# Patient Record
Sex: MALE | Race: ASIAN | Employment: UNEMPLOYED | URBAN - METROPOLITAN AREA
[De-identification: names, ages, dates, MRNs, and addresses within clinical notes are randomized per-mention and may not be internally consistent; named-entity substitution may affect disease eponyms.]

---

## 2017-05-27 ENCOUNTER — APPOINTMENT (EMERGENCY)
Dept: RADIOLOGY | Facility: HOSPITAL | Age: 62
DRG: 281 | End: 2017-05-27
Payer: COMMERCIAL

## 2017-05-27 ENCOUNTER — HOSPITAL ENCOUNTER (INPATIENT)
Facility: HOSPITAL | Age: 62
LOS: 5 days | Discharge: HOME | DRG: 281 | End: 2017-06-02
Attending: EMERGENCY MEDICINE | Admitting: INTERNAL MEDICINE
Payer: COMMERCIAL

## 2017-05-27 DIAGNOSIS — R79.89 ELEVATED SERUM CREATININE: ICD-10-CM

## 2017-05-27 DIAGNOSIS — R55 SYNCOPE: Primary | ICD-10-CM

## 2017-05-27 DIAGNOSIS — R77.8 ELEVATED TROPONIN: ICD-10-CM

## 2017-05-27 DIAGNOSIS — N18.4 STAGE 4 CHRONIC KIDNEY DISEASE (HCC): ICD-10-CM

## 2017-05-27 LAB
ANION GAP SERPL CALCULATED.3IONS-SCNC: 9 MMOL/L (ref 4–13)
APTT PPP: 29 SECONDS (ref 23–35)
BASOPHILS # BLD AUTO: 0.03 THOUSANDS/ΜL (ref 0–0.1)
BASOPHILS NFR BLD AUTO: 0 % (ref 0–1)
BUN SERPL-MCNC: 30 MG/DL (ref 5–25)
CALCIUM SERPL-MCNC: 9.2 MG/DL (ref 8.3–10.1)
CHLORIDE SERPL-SCNC: 102 MMOL/L (ref 100–108)
CO2 SERPL-SCNC: 28 MMOL/L (ref 21–32)
CREAT SERPL-MCNC: 3.01 MG/DL (ref 0.6–1.3)
EOSINOPHIL # BLD AUTO: 0.05 THOUSAND/ΜL (ref 0–0.61)
EOSINOPHIL NFR BLD AUTO: 0 % (ref 0–6)
ERYTHROCYTE [DISTWIDTH] IN BLOOD BY AUTOMATED COUNT: 14.5 % (ref 11.6–15.1)
GFR SERPL CREATININE-BSD FRML MDRD: 21.2 ML/MIN/1.73SQ M
GLUCOSE SERPL-MCNC: 205 MG/DL (ref 65–140)
HCT VFR BLD AUTO: 34.3 % (ref 36.5–49.3)
HGB BLD-MCNC: 11.2 G/DL (ref 12–17)
INR PPP: 1 (ref 0.86–1.16)
LYMPHOCYTES # BLD AUTO: 1.99 THOUSANDS/ΜL (ref 0.6–4.47)
LYMPHOCYTES NFR BLD AUTO: 18 % (ref 14–44)
MCH RBC QN AUTO: 25 PG (ref 26.8–34.3)
MCHC RBC AUTO-ENTMCNC: 32.7 G/DL (ref 31.4–37.4)
MCV RBC AUTO: 77 FL (ref 82–98)
MONOCYTES # BLD AUTO: 1.01 THOUSAND/ΜL (ref 0.17–1.22)
MONOCYTES NFR BLD AUTO: 9 % (ref 4–12)
NEUTROPHILS # BLD AUTO: 8.17 THOUSANDS/ΜL (ref 1.85–7.62)
NEUTS SEG NFR BLD AUTO: 73 % (ref 43–75)
NRBC BLD AUTO-RTO: 0 /100 WBCS
PLATELET # BLD AUTO: 368 THOUSANDS/UL (ref 149–390)
PMV BLD AUTO: 9.7 FL (ref 8.9–12.7)
POTASSIUM SERPL-SCNC: 3.9 MMOL/L (ref 3.5–5.3)
PROTHROMBIN TIME: 13.2 SECONDS (ref 12.1–14.4)
RBC # BLD AUTO: 4.48 MILLION/UL (ref 3.88–5.62)
SODIUM SERPL-SCNC: 139 MMOL/L (ref 136–145)
SPECIMEN SOURCE: ABNORMAL
TROPONIN I BLD-MCNC: 17.83 NG/ML (ref 0–0.08)
WBC # BLD AUTO: 11.31 THOUSAND/UL (ref 4.31–10.16)

## 2017-05-27 PROCEDURE — 36415 COLL VENOUS BLD VENIPUNCTURE: CPT

## 2017-05-27 PROCEDURE — 84484 ASSAY OF TROPONIN QUANT: CPT

## 2017-05-27 PROCEDURE — 80076 HEPATIC FUNCTION PANEL: CPT | Performed by: INTERNAL MEDICINE

## 2017-05-27 PROCEDURE — 85730 THROMBOPLASTIN TIME PARTIAL: CPT | Performed by: EMERGENCY MEDICINE

## 2017-05-27 PROCEDURE — 80048 BASIC METABOLIC PNL TOTAL CA: CPT

## 2017-05-27 PROCEDURE — 93005 ELECTROCARDIOGRAM TRACING: CPT

## 2017-05-27 PROCEDURE — 85610 PROTHROMBIN TIME: CPT | Performed by: EMERGENCY MEDICINE

## 2017-05-27 PROCEDURE — 93005 ELECTROCARDIOGRAM TRACING: CPT | Performed by: EMERGENCY MEDICINE

## 2017-05-27 PROCEDURE — 85025 COMPLETE CBC W/AUTO DIFF WBC: CPT

## 2017-05-28 ENCOUNTER — APPOINTMENT (EMERGENCY)
Dept: RADIOLOGY | Facility: HOSPITAL | Age: 62
DRG: 281 | End: 2017-05-28
Payer: COMMERCIAL

## 2017-05-28 ENCOUNTER — APPOINTMENT (INPATIENT)
Dept: RADIOLOGY | Facility: HOSPITAL | Age: 62
DRG: 281 | End: 2017-05-28
Payer: COMMERCIAL

## 2017-05-28 PROBLEM — R77.8 ELEVATED TROPONIN: Status: ACTIVE | Noted: 2017-05-28

## 2017-05-28 PROBLEM — Z78.9 POOR HISTORIAN: Status: ACTIVE | Noted: 2017-05-28

## 2017-05-28 PROBLEM — Z91.14 NON COMPLIANCE W MEDICATION REGIMEN: Status: ACTIVE | Noted: 2017-05-28

## 2017-05-28 PROBLEM — E11.9 DIABETES MELLITUS TYPE 2 IN NONOBESE (HCC): Status: ACTIVE | Noted: 2017-05-28

## 2017-05-28 PROBLEM — N18.4 STAGE 4 CHRONIC KIDNEY DISEASE (HCC): Status: ACTIVE | Noted: 2017-05-28

## 2017-05-28 PROBLEM — I73.9 PERIPHERAL VASCULAR DISEASE (HCC): Status: ACTIVE | Noted: 2017-05-28

## 2017-05-28 PROBLEM — R55 SYNCOPAL EPISODES: Status: ACTIVE | Noted: 2017-05-28

## 2017-05-28 LAB
ALBUMIN SERPL BCP-MCNC: 3 G/DL (ref 3.5–5)
ALBUMIN SERPL BCP-MCNC: 3.8 G/DL (ref 3.5–5)
ALP SERPL-CCNC: 100 U/L (ref 46–116)
ALP SERPL-CCNC: 77 U/L (ref 46–116)
ALT SERPL W P-5'-P-CCNC: 28 U/L (ref 12–78)
ALT SERPL W P-5'-P-CCNC: 34 U/L (ref 12–78)
ANION GAP SERPL CALCULATED.3IONS-SCNC: 9 MMOL/L (ref 4–13)
APTT PPP: 51 SECONDS (ref 23–35)
APTT PPP: 65 SECONDS (ref 23–35)
APTT PPP: 76 SECONDS (ref 23–35)
AST SERPL W P-5'-P-CCNC: 57 U/L (ref 5–45)
AST SERPL W P-5'-P-CCNC: 98 U/L (ref 5–45)
ATRIAL RATE: 106 BPM
BACTERIA UR QL AUTO: ABNORMAL /HPF
BILIRUB DIRECT SERPL-MCNC: 0.19 MG/DL (ref 0–0.2)
BILIRUB SERPL-MCNC: 0.64 MG/DL (ref 0.2–1)
BILIRUB SERPL-MCNC: 0.87 MG/DL (ref 0.2–1)
BILIRUB UR QL STRIP: NEGATIVE
BUN SERPL-MCNC: 31 MG/DL (ref 5–25)
CALCIUM SERPL-MCNC: 8.6 MG/DL (ref 8.3–10.1)
CHLORIDE SERPL-SCNC: 107 MMOL/L (ref 100–108)
CHLORIDE UR-SCNC: 100 MMOL/L (ref 10–330)
CHOLEST SERPL-MCNC: 103 MG/DL (ref 50–200)
CLARITY UR: CLEAR
CO2 SERPL-SCNC: 25 MMOL/L (ref 21–32)
COLOR UR: YELLOW
CREAT SERPL-MCNC: 2.7 MG/DL (ref 0.6–1.3)
CREAT UR-MCNC: 151 MG/DL
ERYTHROCYTE [DISTWIDTH] IN BLOOD BY AUTOMATED COUNT: 14.6 % (ref 11.6–15.1)
EST. AVERAGE GLUCOSE BLD GHB EST-MCNC: 194 MG/DL
GFR SERPL CREATININE-BSD FRML MDRD: 24.1 ML/MIN/1.73SQ M
GLUCOSE SERPL-MCNC: 154 MG/DL (ref 65–140)
GLUCOSE SERPL-MCNC: 168 MG/DL (ref 65–140)
GLUCOSE SERPL-MCNC: 168 MG/DL (ref 65–140)
GLUCOSE SERPL-MCNC: 178 MG/DL (ref 65–140)
GLUCOSE SERPL-MCNC: 195 MG/DL (ref 65–140)
GLUCOSE SERPL-MCNC: 213 MG/DL (ref 65–140)
GLUCOSE UR STRIP-MCNC: ABNORMAL MG/DL
HBA1C MFR BLD: 8.4 % (ref 4.2–6.3)
HCT VFR BLD AUTO: 29.6 % (ref 36.5–49.3)
HDLC SERPL-MCNC: 40 MG/DL (ref 40–60)
HGB BLD-MCNC: 9.8 G/DL (ref 12–17)
HGB UR QL STRIP.AUTO: NEGATIVE
HYALINE CASTS #/AREA URNS LPF: ABNORMAL /LPF
KETONES UR STRIP-MCNC: NEGATIVE MG/DL
LDLC SERPL CALC-MCNC: 43 MG/DL (ref 0–100)
LEUKOCYTE ESTERASE UR QL STRIP: NEGATIVE
MAGNESIUM SERPL-MCNC: 2.2 MG/DL (ref 1.6–2.6)
MCH RBC QN AUTO: 25.1 PG (ref 26.8–34.3)
MCHC RBC AUTO-ENTMCNC: 33.1 G/DL (ref 31.4–37.4)
MCV RBC AUTO: 76 FL (ref 82–98)
NITRITE UR QL STRIP: NEGATIVE
NON-SQ EPI CELLS URNS QL MICRO: ABNORMAL /HPF
NT-PROBNP SERPL-MCNC: ABNORMAL PG/ML
P AXIS: 81 DEGREES
PH UR STRIP.AUTO: 6 [PH] (ref 4.5–8)
PHOSPHATE SERPL-MCNC: 3.4 MG/DL (ref 2.3–4.1)
PLATELET # BLD AUTO: 340 THOUSANDS/UL (ref 149–390)
PMV BLD AUTO: 9.8 FL (ref 8.9–12.7)
POTASSIUM SERPL-SCNC: 3.9 MMOL/L (ref 3.5–5.3)
PR INTERVAL: 166 MS
PROT SERPL-MCNC: 7 G/DL (ref 6.4–8.2)
PROT SERPL-MCNC: 8.6 G/DL (ref 6.4–8.2)
PROT UR STRIP-MCNC: ABNORMAL MG/DL
QRS AXIS: 101 DEGREES
QRSD INTERVAL: 100 MS
QT INTERVAL: 384 MS
QTC INTERVAL: 510 MS
RBC # BLD AUTO: 3.9 MILLION/UL (ref 3.88–5.62)
RBC #/AREA URNS AUTO: ABNORMAL /HPF
SODIUM 24H UR-SCNC: 107 MOL/L
SODIUM SERPL-SCNC: 141 MMOL/L (ref 136–145)
SP GR UR STRIP.AUTO: 1.02 (ref 1–1.03)
T WAVE AXIS: 189 DEGREES
TRIGL SERPL-MCNC: 100 MG/DL
TROPONIN I SERPL-MCNC: >40 NG/ML
UROBILINOGEN UR QL STRIP.AUTO: 0.2 E.U./DL
VENTRICULAR RATE: 106 BPM
WBC # BLD AUTO: 8.8 THOUSAND/UL (ref 4.31–10.16)
WBC #/AREA URNS AUTO: ABNORMAL /HPF

## 2017-05-28 PROCEDURE — 36415 COLL VENOUS BLD VENIPUNCTURE: CPT | Performed by: INTERNAL MEDICINE

## 2017-05-28 PROCEDURE — 83036 HEMOGLOBIN GLYCOSYLATED A1C: CPT | Performed by: INTERNAL MEDICINE

## 2017-05-28 PROCEDURE — 99285 EMERGENCY DEPT VISIT HI MDM: CPT

## 2017-05-28 PROCEDURE — 83880 ASSAY OF NATRIURETIC PEPTIDE: CPT | Performed by: INTERNAL MEDICINE

## 2017-05-28 PROCEDURE — 82436 ASSAY OF URINE CHLORIDE: CPT | Performed by: INTERNAL MEDICINE

## 2017-05-28 PROCEDURE — 84300 ASSAY OF URINE SODIUM: CPT | Performed by: INTERNAL MEDICINE

## 2017-05-28 PROCEDURE — 70450 CT HEAD/BRAIN W/O DYE: CPT

## 2017-05-28 PROCEDURE — 71250 CT THORAX DX C-: CPT

## 2017-05-28 PROCEDURE — 80061 LIPID PANEL: CPT | Performed by: INTERNAL MEDICINE

## 2017-05-28 PROCEDURE — 82570 ASSAY OF URINE CREATININE: CPT | Performed by: INTERNAL MEDICINE

## 2017-05-28 PROCEDURE — 85730 THROMBOPLASTIN TIME PARTIAL: CPT | Performed by: INTERNAL MEDICINE

## 2017-05-28 PROCEDURE — 84100 ASSAY OF PHOSPHORUS: CPT | Performed by: INTERNAL MEDICINE

## 2017-05-28 PROCEDURE — 81001 URINALYSIS AUTO W/SCOPE: CPT | Performed by: INTERNAL MEDICINE

## 2017-05-28 PROCEDURE — 83735 ASSAY OF MAGNESIUM: CPT | Performed by: INTERNAL MEDICINE

## 2017-05-28 PROCEDURE — 80053 COMPREHEN METABOLIC PANEL: CPT | Performed by: INTERNAL MEDICINE

## 2017-05-28 PROCEDURE — 76770 US EXAM ABDO BACK WALL COMP: CPT

## 2017-05-28 PROCEDURE — 85027 COMPLETE CBC AUTOMATED: CPT | Performed by: INTERNAL MEDICINE

## 2017-05-28 PROCEDURE — 82948 REAGENT STRIP/BLOOD GLUCOSE: CPT

## 2017-05-28 PROCEDURE — 85730 THROMBOPLASTIN TIME PARTIAL: CPT | Performed by: FAMILY MEDICINE

## 2017-05-28 PROCEDURE — 84484 ASSAY OF TROPONIN QUANT: CPT | Performed by: INTERNAL MEDICINE

## 2017-05-28 RX ORDER — ATORVASTATIN CALCIUM 40 MG/1
40 TABLET, FILM COATED ORAL DAILY
COMMUNITY

## 2017-05-28 RX ORDER — ASPIRIN 325 MG
325 TABLET ORAL DAILY
Status: DISCONTINUED | OUTPATIENT
Start: 2017-05-28 | End: 2017-05-29

## 2017-05-28 RX ORDER — HEPARIN SODIUM 1000 [USP'U]/ML
5600 INJECTION, SOLUTION INTRAVENOUS; SUBCUTANEOUS ONCE
Status: COMPLETED | OUTPATIENT
Start: 2017-05-28 | End: 2017-05-28

## 2017-05-28 RX ORDER — ATORVASTATIN CALCIUM 40 MG/1
40 TABLET, FILM COATED ORAL EVERY EVENING
Status: DISCONTINUED | OUTPATIENT
Start: 2017-05-29 | End: 2017-06-02 | Stop reason: HOSPADM

## 2017-05-28 RX ORDER — LOSARTAN POTASSIUM 25 MG/1
25 TABLET ORAL DAILY
COMMUNITY
End: 2017-06-02 | Stop reason: HOSPADM

## 2017-05-28 RX ORDER — ATORVASTATIN CALCIUM 80 MG/1
80 TABLET, FILM COATED ORAL ONCE
Status: COMPLETED | OUTPATIENT
Start: 2017-05-28 | End: 2017-05-28

## 2017-05-28 RX ORDER — HEPARIN SODIUM 1000 [USP'U]/ML
2800 INJECTION, SOLUTION INTRAVENOUS; SUBCUTANEOUS AS NEEDED
Status: DISCONTINUED | OUTPATIENT
Start: 2017-05-28 | End: 2017-05-28 | Stop reason: DRUGHIGH

## 2017-05-28 RX ORDER — LABETALOL 200 MG/1
200 TABLET, FILM COATED ORAL 2 TIMES DAILY
COMMUNITY
End: 2017-06-02 | Stop reason: HOSPADM

## 2017-05-28 RX ORDER — CLOPIDOGREL BISULFATE 75 MG/1
300 TABLET ORAL ONCE
Status: COMPLETED | OUTPATIENT
Start: 2017-05-28 | End: 2017-05-28

## 2017-05-28 RX ORDER — HEPARIN SODIUM 10000 [USP'U]/100ML
3-30 INJECTION, SOLUTION INTRAVENOUS
Status: DISCONTINUED | OUTPATIENT
Start: 2017-05-28 | End: 2017-05-28 | Stop reason: DRUGHIGH

## 2017-05-28 RX ORDER — AMLODIPINE BESYLATE 10 MG/1
10 TABLET ORAL DAILY
COMMUNITY
End: 2017-06-02 | Stop reason: HOSPADM

## 2017-05-28 RX ORDER — HEPARIN SODIUM 1000 [USP'U]/ML
5600 INJECTION, SOLUTION INTRAVENOUS; SUBCUTANEOUS AS NEEDED
Status: DISCONTINUED | OUTPATIENT
Start: 2017-05-28 | End: 2017-05-28 | Stop reason: DRUGHIGH

## 2017-05-28 RX ORDER — ATORVASTATIN CALCIUM 40 MG/1
40 TABLET, FILM COATED ORAL EVERY EVENING
Status: DISCONTINUED | OUTPATIENT
Start: 2017-05-28 | End: 2017-05-28

## 2017-05-28 RX ORDER — HEPARIN SODIUM 1000 [USP'U]/ML
2000 INJECTION, SOLUTION INTRAVENOUS; SUBCUTANEOUS AS NEEDED
Status: DISCONTINUED | OUTPATIENT
Start: 2017-05-28 | End: 2017-06-01

## 2017-05-28 RX ORDER — HEPARIN SODIUM 1000 [USP'U]/ML
4000 INJECTION, SOLUTION INTRAVENOUS; SUBCUTANEOUS ONCE
Status: DISCONTINUED | OUTPATIENT
Start: 2017-05-28 | End: 2017-06-01

## 2017-05-28 RX ORDER — CLOPIDOGREL BISULFATE 75 MG/1
75 TABLET ORAL DAILY
Status: DISCONTINUED | OUTPATIENT
Start: 2017-05-28 | End: 2017-06-02 | Stop reason: HOSPADM

## 2017-05-28 RX ORDER — ASPIRIN 81 MG/1
324 TABLET, CHEWABLE ORAL ONCE
Status: COMPLETED | OUTPATIENT
Start: 2017-05-28 | End: 2017-05-28

## 2017-05-28 RX ORDER — ASPIRIN 81 MG/1
81 TABLET, CHEWABLE ORAL DAILY
Status: DISCONTINUED | OUTPATIENT
Start: 2017-05-28 | End: 2017-06-02 | Stop reason: HOSPADM

## 2017-05-28 RX ORDER — HEPARIN SODIUM 1000 [USP'U]/ML
4000 INJECTION, SOLUTION INTRAVENOUS; SUBCUTANEOUS AS NEEDED
Status: DISCONTINUED | OUTPATIENT
Start: 2017-05-28 | End: 2017-06-01

## 2017-05-28 RX ORDER — MAGNESIUM HYDROXIDE/ALUMINUM HYDROXICE/SIMETHICONE 120; 1200; 1200 MG/30ML; MG/30ML; MG/30ML
15 SUSPENSION ORAL EVERY 6 HOURS PRN
Status: DISCONTINUED | OUTPATIENT
Start: 2017-05-28 | End: 2017-05-29 | Stop reason: SDUPTHER

## 2017-05-28 RX ORDER — ONDANSETRON 2 MG/ML
4 INJECTION INTRAMUSCULAR; INTRAVENOUS EVERY 6 HOURS PRN
Status: DISCONTINUED | OUTPATIENT
Start: 2017-05-28 | End: 2017-06-02 | Stop reason: HOSPADM

## 2017-05-28 RX ORDER — HEPARIN SODIUM 10000 [USP'U]/100ML
3-20 INJECTION, SOLUTION INTRAVENOUS
Status: DISCONTINUED | OUTPATIENT
Start: 2017-05-28 | End: 2017-06-01

## 2017-05-28 RX ORDER — ATORVASTATIN CALCIUM 40 MG/1
40 TABLET, FILM COATED ORAL
Status: DISCONTINUED | OUTPATIENT
Start: 2017-05-28 | End: 2017-05-28 | Stop reason: SDUPTHER

## 2017-05-28 RX ORDER — ZOLPIDEM TARTRATE 5 MG/1
5 TABLET ORAL
Status: DISCONTINUED | OUTPATIENT
Start: 2017-05-28 | End: 2017-06-02 | Stop reason: HOSPADM

## 2017-05-28 RX ORDER — ASPIRIN 81 MG/1
81 TABLET, CHEWABLE ORAL DAILY
COMMUNITY

## 2017-05-28 RX ORDER — SODIUM CHLORIDE 9 MG/ML
100 INJECTION, SOLUTION INTRAVENOUS CONTINUOUS
Status: DISCONTINUED | OUTPATIENT
Start: 2017-05-28 | End: 2017-05-31

## 2017-05-28 RX ADMIN — HEPARIN SODIUM 2000 UNITS: 1000 INJECTION, SOLUTION INTRAVENOUS; SUBCUTANEOUS at 16:45

## 2017-05-28 RX ADMIN — CLOPIDOGREL BISULFATE 75 MG: 75 TABLET ORAL at 09:13

## 2017-05-28 RX ADMIN — INSULIN LISPRO 2 UNITS: 100 INJECTION, SOLUTION INTRAVENOUS; SUBCUTANEOUS at 06:40

## 2017-05-28 RX ADMIN — METOPROLOL TARTRATE 12.5 MG: 25 TABLET ORAL at 21:22

## 2017-05-28 RX ADMIN — ASPIRIN 325 MG: 325 TABLET ORAL at 09:13

## 2017-05-28 RX ADMIN — ATORVASTATIN CALCIUM 80 MG: 80 TABLET, FILM COATED ORAL at 09:13

## 2017-05-28 RX ADMIN — INSULIN LISPRO 1 UNITS: 100 INJECTION, SOLUTION INTRAVENOUS; SUBCUTANEOUS at 12:49

## 2017-05-28 RX ADMIN — SODIUM CHLORIDE 100 ML/HR: 0.9 INJECTION, SOLUTION INTRAVENOUS at 02:12

## 2017-05-28 RX ADMIN — HEPARIN SODIUM 5600 UNITS: 1000 INJECTION, SOLUTION INTRAVENOUS; SUBCUTANEOUS at 02:15

## 2017-05-28 RX ADMIN — INSULIN LISPRO 1 UNITS: 100 INJECTION, SOLUTION INTRAVENOUS; SUBCUTANEOUS at 16:30

## 2017-05-28 RX ADMIN — INSULIN LISPRO 1 UNITS: 100 INJECTION, SOLUTION INTRAVENOUS; SUBCUTANEOUS at 21:26

## 2017-05-28 RX ADMIN — METOPROLOL TARTRATE 12.5 MG: 25 TABLET ORAL at 09:13

## 2017-05-28 RX ADMIN — INSULIN LISPRO 1 UNITS: 100 INJECTION, SOLUTION INTRAVENOUS; SUBCUTANEOUS at 03:21

## 2017-05-28 RX ADMIN — METOPROLOL TARTRATE 12.5 MG: 25 TABLET ORAL at 01:27

## 2017-05-28 RX ADMIN — HEPARIN SODIUM AND DEXTROSE 18 UNITS/KG/HR: 10000; 5 INJECTION INTRAVENOUS at 02:19

## 2017-05-28 RX ADMIN — CLOPIDOGREL BISULFATE 300 MG: 75 TABLET ORAL at 01:27

## 2017-05-28 RX ADMIN — ASPIRIN 324 MG: 81 TABLET, CHEWABLE ORAL at 01:27

## 2017-05-28 RX ADMIN — HEPARIN SODIUM 12 UNITS/KG/HR: 10000 INJECTION, SOLUTION INTRAVENOUS at 03:03

## 2017-05-29 ENCOUNTER — APPOINTMENT (INPATIENT)
Dept: RADIOLOGY | Facility: HOSPITAL | Age: 62
DRG: 281 | End: 2017-05-29
Payer: COMMERCIAL

## 2017-05-29 ENCOUNTER — APPOINTMENT (INPATIENT)
Dept: NON INVASIVE DIAGNOSTICS | Facility: HOSPITAL | Age: 62
DRG: 281 | End: 2017-05-29
Payer: COMMERCIAL

## 2017-05-29 LAB
ANION GAP SERPL CALCULATED.3IONS-SCNC: 8 MMOL/L (ref 4–13)
APTT PPP: 50 SECONDS (ref 23–35)
APTT PPP: 64 SECONDS (ref 23–35)
APTT PPP: 73 SECONDS (ref 23–35)
ATRIAL RATE: 100 BPM
BASOPHILS # BLD AUTO: 0.05 THOUSANDS/ΜL (ref 0–0.1)
BASOPHILS NFR BLD AUTO: 0 % (ref 0–1)
BUN SERPL-MCNC: 33 MG/DL (ref 5–25)
CALCIUM SERPL-MCNC: 8.7 MG/DL (ref 8.3–10.1)
CHLORIDE SERPL-SCNC: 106 MMOL/L (ref 100–108)
CO2 SERPL-SCNC: 25 MMOL/L (ref 21–32)
CREAT SERPL-MCNC: 2.6 MG/DL (ref 0.6–1.3)
CREAT UR-MCNC: <13 MG/DL
EOSINOPHIL # BLD AUTO: 0.19 THOUSAND/ΜL (ref 0–0.61)
EOSINOPHIL NFR BLD AUTO: 2 % (ref 0–6)
ERYTHROCYTE [DISTWIDTH] IN BLOOD BY AUTOMATED COUNT: 14.5 % (ref 11.6–15.1)
GFR SERPL CREATININE-BSD FRML MDRD: 25.1 ML/MIN/1.73SQ M
GLUCOSE SERPL-MCNC: 171 MG/DL (ref 65–140)
GLUCOSE SERPL-MCNC: 172 MG/DL (ref 65–140)
GLUCOSE SERPL-MCNC: 180 MG/DL (ref 65–140)
GLUCOSE SERPL-MCNC: 189 MG/DL (ref 65–140)
GLUCOSE SERPL-MCNC: 195 MG/DL (ref 65–140)
HCT VFR BLD AUTO: 33.7 % (ref 36.5–49.3)
HGB BLD-MCNC: 11.2 G/DL (ref 12–17)
INR PPP: 0.94 (ref 0.86–1.16)
LYMPHOCYTES # BLD AUTO: 1.95 THOUSANDS/ΜL (ref 0.6–4.47)
LYMPHOCYTES NFR BLD AUTO: 15 % (ref 14–44)
MCH RBC QN AUTO: 25.7 PG (ref 26.8–34.3)
MCHC RBC AUTO-ENTMCNC: 33.2 G/DL (ref 31.4–37.4)
MCV RBC AUTO: 78 FL (ref 82–98)
MONOCYTES # BLD AUTO: 0.88 THOUSAND/ΜL (ref 0.17–1.22)
MONOCYTES NFR BLD AUTO: 7 % (ref 4–12)
NEUTROPHILS # BLD AUTO: 9.51 THOUSANDS/ΜL (ref 1.85–7.62)
NEUTS SEG NFR BLD AUTO: 76 % (ref 43–75)
NRBC BLD AUTO-RTO: 0 /100 WBCS
P AXIS: 79 DEGREES
PHOSPHATE SERPL-MCNC: 3.2 MG/DL (ref 2.3–4.1)
PLATELET # BLD AUTO: 393 THOUSANDS/UL (ref 149–390)
PMV BLD AUTO: 10.3 FL (ref 8.9–12.7)
POTASSIUM SERPL-SCNC: 4 MMOL/L (ref 3.5–5.3)
PR INTERVAL: 172 MS
PROT UR-MCNC: 7 MG/DL
PROT/CREAT UR: >0.54 MG/G{CREAT} (ref 0–0.1)
PROTHROMBIN TIME: 12.6 SECONDS (ref 12.1–14.4)
PTH-INTACT SERPL-MCNC: 149.5 PG/ML (ref 14–72)
QRS AXIS: 102 DEGREES
QRSD INTERVAL: 98 MS
QT INTERVAL: 390 MS
QTC INTERVAL: 503 MS
RBC # BLD AUTO: 4.35 MILLION/UL (ref 3.88–5.62)
SODIUM SERPL-SCNC: 139 MMOL/L (ref 136–145)
T WAVE AXIS: 180 DEGREES
VENTRICULAR RATE: 100 BPM
WBC # BLD AUTO: 12.63 THOUSAND/UL (ref 4.31–10.16)

## 2017-05-29 PROCEDURE — 85025 COMPLETE CBC W/AUTO DIFF WBC: CPT | Performed by: FAMILY MEDICINE

## 2017-05-29 PROCEDURE — 82570 ASSAY OF URINE CREATININE: CPT | Performed by: INTERNAL MEDICINE

## 2017-05-29 PROCEDURE — 71010 HB CHEST X-RAY 1 VIEW FRONTAL (PORTABLE): CPT

## 2017-05-29 PROCEDURE — 85730 THROMBOPLASTIN TIME PARTIAL: CPT | Performed by: FAMILY MEDICINE

## 2017-05-29 PROCEDURE — 84100 ASSAY OF PHOSPHORUS: CPT | Performed by: INTERNAL MEDICINE

## 2017-05-29 PROCEDURE — 84156 ASSAY OF PROTEIN URINE: CPT | Performed by: INTERNAL MEDICINE

## 2017-05-29 PROCEDURE — 83970 ASSAY OF PARATHORMONE: CPT | Performed by: INTERNAL MEDICINE

## 2017-05-29 PROCEDURE — 93306 TTE W/DOPPLER COMPLETE: CPT

## 2017-05-29 PROCEDURE — 82948 REAGENT STRIP/BLOOD GLUCOSE: CPT

## 2017-05-29 PROCEDURE — 80048 BASIC METABOLIC PNL TOTAL CA: CPT | Performed by: FAMILY MEDICINE

## 2017-05-29 PROCEDURE — 85610 PROTHROMBIN TIME: CPT | Performed by: INTERNAL MEDICINE

## 2017-05-29 RX ORDER — ACETAMINOPHEN 325 MG/1
650 TABLET ORAL EVERY 6 HOURS PRN
Status: DISCONTINUED | OUTPATIENT
Start: 2017-05-29 | End: 2017-06-02 | Stop reason: HOSPADM

## 2017-05-29 RX ORDER — FUROSEMIDE 10 MG/ML
40 INJECTION INTRAMUSCULAR; INTRAVENOUS ONCE
Status: COMPLETED | OUTPATIENT
Start: 2017-05-29 | End: 2017-05-29

## 2017-05-29 RX ORDER — GUAIFENESIN 600 MG
600 TABLET, EXTENDED RELEASE 12 HR ORAL EVERY 12 HOURS SCHEDULED
Status: DISCONTINUED | OUTPATIENT
Start: 2017-05-29 | End: 2017-06-02 | Stop reason: HOSPADM

## 2017-05-29 RX ORDER — INSULIN GLARGINE 100 [IU]/ML
8 INJECTION, SOLUTION SUBCUTANEOUS
Status: DISCONTINUED | OUTPATIENT
Start: 2017-05-29 | End: 2017-06-02 | Stop reason: HOSPADM

## 2017-05-29 RX ORDER — BENZONATATE 100 MG/1
200 CAPSULE ORAL 3 TIMES DAILY PRN
Status: DISCONTINUED | OUTPATIENT
Start: 2017-05-29 | End: 2017-06-02 | Stop reason: HOSPADM

## 2017-05-29 RX ADMIN — METOPROLOL TARTRATE 12.5 MG: 25 TABLET ORAL at 08:24

## 2017-05-29 RX ADMIN — INSULIN LISPRO 2 UNITS: 100 INJECTION, SOLUTION INTRAVENOUS; SUBCUTANEOUS at 17:41

## 2017-05-29 RX ADMIN — HEPARIN SODIUM 2000 UNITS: 1000 INJECTION, SOLUTION INTRAVENOUS; SUBCUTANEOUS at 06:54

## 2017-05-29 RX ADMIN — HEPARIN SODIUM 14 UNITS/KG/HR: 10000 INJECTION, SOLUTION INTRAVENOUS at 04:44

## 2017-05-29 RX ADMIN — METOPROLOL TARTRATE 12.5 MG: 25 TABLET ORAL at 15:48

## 2017-05-29 RX ADMIN — INSULIN LISPRO 1 UNITS: 100 INJECTION, SOLUTION INTRAVENOUS; SUBCUTANEOUS at 21:26

## 2017-05-29 RX ADMIN — INSULIN GLARGINE 8 UNITS: 100 INJECTION, SOLUTION SUBCUTANEOUS at 21:24

## 2017-05-29 RX ADMIN — ATORVASTATIN CALCIUM 40 MG: 40 TABLET, FILM COATED ORAL at 17:41

## 2017-05-29 RX ADMIN — ASPIRIN 81 MG: 81 TABLET, CHEWABLE ORAL at 12:54

## 2017-05-29 RX ADMIN — INSULIN LISPRO 1 UNITS: 100 INJECTION, SOLUTION INTRAVENOUS; SUBCUTANEOUS at 12:55

## 2017-05-29 RX ADMIN — METOPROLOL TARTRATE 12.5 MG: 25 TABLET ORAL at 20:27

## 2017-05-29 RX ADMIN — INSULIN LISPRO 1 UNITS: 100 INJECTION, SOLUTION INTRAVENOUS; SUBCUTANEOUS at 06:11

## 2017-05-29 RX ADMIN — FUROSEMIDE 40 MG: 10 INJECTION, SOLUTION INTRAMUSCULAR; INTRAVENOUS at 12:54

## 2017-05-29 RX ADMIN — ACETAMINOPHEN 650 MG: 325 TABLET, FILM COATED ORAL at 15:48

## 2017-05-29 RX ADMIN — CLOPIDOGREL BISULFATE 75 MG: 75 TABLET ORAL at 08:24

## 2017-05-29 RX ADMIN — GUAIFENESIN 600 MG: 600 TABLET, EXTENDED RELEASE ORAL at 20:27

## 2017-05-30 LAB
ANION GAP SERPL CALCULATED.3IONS-SCNC: 7 MMOL/L (ref 4–13)
APTT PPP: 80 SECONDS (ref 23–35)
BUN SERPL-MCNC: 31 MG/DL (ref 5–25)
CALCIUM SERPL-MCNC: 8.7 MG/DL (ref 8.3–10.1)
CHLORIDE SERPL-SCNC: 106 MMOL/L (ref 100–108)
CO2 SERPL-SCNC: 26 MMOL/L (ref 21–32)
CREAT SERPL-MCNC: 2.71 MG/DL (ref 0.6–1.3)
CREAT UR-MCNC: 30.8 MG/DL
GFR SERPL CREATININE-BSD FRML MDRD: 24 ML/MIN/1.73SQ M
GLUCOSE SERPL-MCNC: 141 MG/DL (ref 65–140)
GLUCOSE SERPL-MCNC: 149 MG/DL (ref 65–140)
GLUCOSE SERPL-MCNC: 153 MG/DL (ref 65–140)
GLUCOSE SERPL-MCNC: 179 MG/DL (ref 65–140)
GLUCOSE SERPL-MCNC: 210 MG/DL (ref 65–140)
POTASSIUM SERPL-SCNC: 3.8 MMOL/L (ref 3.5–5.3)
PROT UR-MCNC: 9 MG/DL
PROT/CREAT UR: 0.29 MG/G{CREAT} (ref 0–0.1)
SODIUM SERPL-SCNC: 139 MMOL/L (ref 136–145)

## 2017-05-30 PROCEDURE — 80048 BASIC METABOLIC PNL TOTAL CA: CPT | Performed by: INTERNAL MEDICINE

## 2017-05-30 PROCEDURE — 82948 REAGENT STRIP/BLOOD GLUCOSE: CPT

## 2017-05-30 PROCEDURE — 82570 ASSAY OF URINE CREATININE: CPT | Performed by: INTERNAL MEDICINE

## 2017-05-30 PROCEDURE — 84156 ASSAY OF PROTEIN URINE: CPT | Performed by: INTERNAL MEDICINE

## 2017-05-30 PROCEDURE — 85730 THROMBOPLASTIN TIME PARTIAL: CPT | Performed by: FAMILY MEDICINE

## 2017-05-30 RX ORDER — FUROSEMIDE 10 MG/ML
40 INJECTION INTRAMUSCULAR; INTRAVENOUS ONCE
Status: COMPLETED | OUTPATIENT
Start: 2017-05-30 | End: 2017-05-30

## 2017-05-30 RX ADMIN — CLOPIDOGREL BISULFATE 75 MG: 75 TABLET ORAL at 08:48

## 2017-05-30 RX ADMIN — HEPARIN SODIUM 16 UNITS/KG/HR: 10000 INJECTION, SOLUTION INTRAVENOUS at 03:02

## 2017-05-30 RX ADMIN — METOPROLOL TARTRATE 25 MG: 25 TABLET ORAL at 21:18

## 2017-05-30 RX ADMIN — INSULIN LISPRO 1 UNITS: 100 INJECTION, SOLUTION INTRAVENOUS; SUBCUTANEOUS at 11:40

## 2017-05-30 RX ADMIN — ASPIRIN 81 MG: 81 TABLET, CHEWABLE ORAL at 08:48

## 2017-05-30 RX ADMIN — ATORVASTATIN CALCIUM 40 MG: 40 TABLET, FILM COATED ORAL at 18:43

## 2017-05-30 RX ADMIN — FUROSEMIDE 40 MG: 10 INJECTION, SOLUTION INTRAMUSCULAR; INTRAVENOUS at 11:35

## 2017-05-30 RX ADMIN — INSULIN LISPRO 2 UNITS: 100 INJECTION, SOLUTION INTRAVENOUS; SUBCUTANEOUS at 16:24

## 2017-05-30 RX ADMIN — GUAIFENESIN 600 MG: 600 TABLET, EXTENDED RELEASE ORAL at 21:18

## 2017-05-30 RX ADMIN — INSULIN GLARGINE 8 UNITS: 100 INJECTION, SOLUTION SUBCUTANEOUS at 21:19

## 2017-05-30 RX ADMIN — GUAIFENESIN 600 MG: 600 TABLET, EXTENDED RELEASE ORAL at 08:48

## 2017-05-30 RX ADMIN — METOPROLOL TARTRATE 25 MG: 25 TABLET ORAL at 08:48

## 2017-05-31 LAB
ANION GAP SERPL CALCULATED.3IONS-SCNC: 9 MMOL/L (ref 4–13)
APTT PPP: 56 SECONDS (ref 23–35)
BASOPHILS # BLD AUTO: 0.03 THOUSANDS/ΜL (ref 0–0.1)
BASOPHILS NFR BLD AUTO: 0 % (ref 0–1)
BUN SERPL-MCNC: 34 MG/DL (ref 5–25)
CALCIUM SERPL-MCNC: 9 MG/DL (ref 8.3–10.1)
CHLORIDE SERPL-SCNC: 102 MMOL/L (ref 100–108)
CO2 SERPL-SCNC: 27 MMOL/L (ref 21–32)
CREAT SERPL-MCNC: 2.73 MG/DL (ref 0.6–1.3)
EOSINOPHIL # BLD AUTO: 0.2 THOUSAND/ΜL (ref 0–0.61)
EOSINOPHIL NFR BLD AUTO: 3 % (ref 0–6)
ERYTHROCYTE [DISTWIDTH] IN BLOOD BY AUTOMATED COUNT: 14.4 % (ref 11.6–15.1)
GFR SERPL CREATININE-BSD FRML MDRD: 23.8 ML/MIN/1.73SQ M
GLUCOSE SERPL-MCNC: 139 MG/DL (ref 65–140)
GLUCOSE SERPL-MCNC: 145 MG/DL (ref 65–140)
GLUCOSE SERPL-MCNC: 150 MG/DL (ref 65–140)
GLUCOSE SERPL-MCNC: 150 MG/DL (ref 65–140)
GLUCOSE SERPL-MCNC: 196 MG/DL (ref 65–140)
HCT VFR BLD AUTO: 31.3 % (ref 36.5–49.3)
HGB BLD-MCNC: 10.2 G/DL (ref 12–17)
LYMPHOCYTES # BLD AUTO: 2.38 THOUSANDS/ΜL (ref 0.6–4.47)
LYMPHOCYTES NFR BLD AUTO: 30 % (ref 14–44)
MCH RBC QN AUTO: 24.7 PG (ref 26.8–34.3)
MCHC RBC AUTO-ENTMCNC: 32.6 G/DL (ref 31.4–37.4)
MCV RBC AUTO: 76 FL (ref 82–98)
MONOCYTES # BLD AUTO: 0.89 THOUSAND/ΜL (ref 0.17–1.22)
MONOCYTES NFR BLD AUTO: 11 % (ref 4–12)
NEUTROPHILS # BLD AUTO: 4.36 THOUSANDS/ΜL (ref 1.85–7.62)
NEUTS SEG NFR BLD AUTO: 56 % (ref 43–75)
NRBC BLD AUTO-RTO: 0 /100 WBCS
PLATELET # BLD AUTO: 354 THOUSANDS/UL (ref 149–390)
PMV BLD AUTO: 9.8 FL (ref 8.9–12.7)
POTASSIUM SERPL-SCNC: 3.5 MMOL/L (ref 3.5–5.3)
RBC # BLD AUTO: 4.13 MILLION/UL (ref 3.88–5.62)
SODIUM SERPL-SCNC: 138 MMOL/L (ref 136–145)
WBC # BLD AUTO: 7.91 THOUSAND/UL (ref 4.31–10.16)

## 2017-05-31 PROCEDURE — 80048 BASIC METABOLIC PNL TOTAL CA: CPT | Performed by: INTERNAL MEDICINE

## 2017-05-31 PROCEDURE — 82948 REAGENT STRIP/BLOOD GLUCOSE: CPT

## 2017-05-31 PROCEDURE — 85025 COMPLETE CBC W/AUTO DIFF WBC: CPT | Performed by: INTERNAL MEDICINE

## 2017-05-31 PROCEDURE — 85730 THROMBOPLASTIN TIME PARTIAL: CPT | Performed by: INTERNAL MEDICINE

## 2017-05-31 RX ORDER — FUROSEMIDE 40 MG/1
40 TABLET ORAL DAILY
Status: DISCONTINUED | OUTPATIENT
Start: 2017-05-31 | End: 2017-06-01

## 2017-05-31 RX ORDER — LOSARTAN POTASSIUM 50 MG/1
25 TABLET ORAL DAILY
Status: DISCONTINUED | OUTPATIENT
Start: 2017-05-31 | End: 2017-06-01

## 2017-05-31 RX ADMIN — METOPROLOL TARTRATE 25 MG: 25 TABLET ORAL at 22:02

## 2017-05-31 RX ADMIN — CLOPIDOGREL BISULFATE 75 MG: 75 TABLET ORAL at 08:13

## 2017-05-31 RX ADMIN — ACETAMINOPHEN 650 MG: 325 TABLET, FILM COATED ORAL at 20:29

## 2017-05-31 RX ADMIN — FUROSEMIDE 40 MG: 40 TABLET ORAL at 13:29

## 2017-05-31 RX ADMIN — METOPROLOL TARTRATE 25 MG: 25 TABLET ORAL at 08:13

## 2017-05-31 RX ADMIN — HEPARIN SODIUM 18 UNITS/KG/HR: 10000 INJECTION, SOLUTION INTRAVENOUS at 14:57

## 2017-05-31 RX ADMIN — INSULIN LISPRO 2 UNITS: 100 INJECTION, SOLUTION INTRAVENOUS; SUBCUTANEOUS at 11:16

## 2017-05-31 RX ADMIN — HEPARIN SODIUM 16 UNITS/KG/HR: 10000 INJECTION, SOLUTION INTRAVENOUS at 02:21

## 2017-05-31 RX ADMIN — INSULIN LISPRO 1 UNITS: 100 INJECTION, SOLUTION INTRAVENOUS; SUBCUTANEOUS at 17:40

## 2017-05-31 RX ADMIN — HEPARIN SODIUM 2000 UNITS: 1000 INJECTION, SOLUTION INTRAVENOUS; SUBCUTANEOUS at 08:14

## 2017-05-31 RX ADMIN — ASPIRIN 81 MG: 81 TABLET, CHEWABLE ORAL at 08:13

## 2017-05-31 RX ADMIN — GUAIFENESIN 600 MG: 600 TABLET, EXTENDED RELEASE ORAL at 08:12

## 2017-05-31 RX ADMIN — INSULIN GLARGINE 8 UNITS: 100 INJECTION, SOLUTION SUBCUTANEOUS at 22:03

## 2017-05-31 RX ADMIN — LOSARTAN POTASSIUM 25 MG: 50 TABLET, FILM COATED ORAL at 17:37

## 2017-05-31 RX ADMIN — GUAIFENESIN 600 MG: 600 TABLET, EXTENDED RELEASE ORAL at 22:03

## 2017-05-31 RX ADMIN — ATORVASTATIN CALCIUM 40 MG: 40 TABLET, FILM COATED ORAL at 17:40

## 2017-06-01 LAB
ANION GAP SERPL CALCULATED.3IONS-SCNC: 10 MMOL/L (ref 4–13)
APTT PPP: 60 SECONDS (ref 23–35)
BUN SERPL-MCNC: 40 MG/DL (ref 5–25)
CALCIUM SERPL-MCNC: 9.1 MG/DL (ref 8.3–10.1)
CHLORIDE SERPL-SCNC: 102 MMOL/L (ref 100–108)
CO2 SERPL-SCNC: 26 MMOL/L (ref 21–32)
CREAT SERPL-MCNC: 3.03 MG/DL (ref 0.6–1.3)
GFR SERPL CREATININE-BSD FRML MDRD: 21.1 ML/MIN/1.73SQ M
GLUCOSE SERPL-MCNC: 128 MG/DL (ref 65–140)
GLUCOSE SERPL-MCNC: 164 MG/DL (ref 65–140)
GLUCOSE SERPL-MCNC: 191 MG/DL (ref 65–140)
GLUCOSE SERPL-MCNC: 201 MG/DL (ref 65–140)
GLUCOSE SERPL-MCNC: 201 MG/DL (ref 65–140)
POTASSIUM SERPL-SCNC: 3.6 MMOL/L (ref 3.5–5.3)
SODIUM SERPL-SCNC: 138 MMOL/L (ref 136–145)

## 2017-06-01 PROCEDURE — 82948 REAGENT STRIP/BLOOD GLUCOSE: CPT

## 2017-06-01 PROCEDURE — 80048 BASIC METABOLIC PNL TOTAL CA: CPT | Performed by: PHYSICIAN ASSISTANT

## 2017-06-01 PROCEDURE — 85730 THROMBOPLASTIN TIME PARTIAL: CPT | Performed by: INTERNAL MEDICINE

## 2017-06-01 RX ADMIN — INSULIN LISPRO 2 UNITS: 100 INJECTION, SOLUTION INTRAVENOUS; SUBCUTANEOUS at 16:24

## 2017-06-01 RX ADMIN — HEPARIN SODIUM 18 UNITS/KG/HR: 10000 INJECTION, SOLUTION INTRAVENOUS at 08:42

## 2017-06-01 RX ADMIN — FUROSEMIDE 40 MG: 40 TABLET ORAL at 08:42

## 2017-06-01 RX ADMIN — CLOPIDOGREL BISULFATE 75 MG: 75 TABLET ORAL at 08:43

## 2017-06-01 RX ADMIN — ATORVASTATIN CALCIUM 40 MG: 40 TABLET, FILM COATED ORAL at 17:10

## 2017-06-01 RX ADMIN — INSULIN LISPRO 1 UNITS: 100 INJECTION, SOLUTION INTRAVENOUS; SUBCUTANEOUS at 06:18

## 2017-06-01 RX ADMIN — GUAIFENESIN 600 MG: 600 TABLET, EXTENDED RELEASE ORAL at 08:43

## 2017-06-01 RX ADMIN — GUAIFENESIN 600 MG: 600 TABLET, EXTENDED RELEASE ORAL at 21:27

## 2017-06-01 RX ADMIN — METOPROLOL TARTRATE 25 MG: 25 TABLET ORAL at 21:27

## 2017-06-01 RX ADMIN — INSULIN LISPRO 1 UNITS: 100 INJECTION, SOLUTION INTRAVENOUS; SUBCUTANEOUS at 11:49

## 2017-06-01 RX ADMIN — INSULIN GLARGINE 8 UNITS: 100 INJECTION, SOLUTION SUBCUTANEOUS at 21:28

## 2017-06-01 RX ADMIN — LOSARTAN POTASSIUM 25 MG: 50 TABLET, FILM COATED ORAL at 08:42

## 2017-06-01 RX ADMIN — INSULIN LISPRO 1 UNITS: 100 INJECTION, SOLUTION INTRAVENOUS; SUBCUTANEOUS at 21:28

## 2017-06-01 RX ADMIN — METOPROLOL TARTRATE 25 MG: 25 TABLET ORAL at 08:43

## 2017-06-01 RX ADMIN — ASPIRIN 81 MG: 81 TABLET, CHEWABLE ORAL at 08:42

## 2017-06-02 VITALS
SYSTOLIC BLOOD PRESSURE: 134 MMHG | RESPIRATION RATE: 18 BRPM | OXYGEN SATURATION: 99 % | HEIGHT: 66 IN | BODY MASS INDEX: 26.57 KG/M2 | TEMPERATURE: 97.3 F | WEIGHT: 165.34 LBS | HEART RATE: 62 BPM | DIASTOLIC BLOOD PRESSURE: 63 MMHG

## 2017-06-02 PROBLEM — I21.4 NSTEMI (NON-ST ELEVATED MYOCARDIAL INFARCTION) (HCC): Status: ACTIVE | Noted: 2017-06-02

## 2017-06-02 PROBLEM — R55 SYNCOPAL EPISODES: Status: RESOLVED | Noted: 2017-05-28 | Resolved: 2017-06-02

## 2017-06-02 PROBLEM — Z78.9 POOR HISTORIAN: Status: RESOLVED | Noted: 2017-05-28 | Resolved: 2017-06-02

## 2017-06-02 LAB
ANION GAP SERPL CALCULATED.3IONS-SCNC: 10 MMOL/L (ref 4–13)
BUN SERPL-MCNC: 61 MG/DL (ref 5–25)
CALCIUM SERPL-MCNC: 8.9 MG/DL (ref 8.3–10.1)
CHLORIDE SERPL-SCNC: 104 MMOL/L (ref 100–108)
CO2 SERPL-SCNC: 25 MMOL/L (ref 21–32)
CREAT SERPL-MCNC: 3.57 MG/DL (ref 0.6–1.3)
GFR SERPL CREATININE-BSD FRML MDRD: 17.4 ML/MIN/1.73SQ M
GLUCOSE SERPL-MCNC: 154 MG/DL (ref 65–140)
GLUCOSE SERPL-MCNC: 166 MG/DL (ref 65–140)
GLUCOSE SERPL-MCNC: 180 MG/DL (ref 65–140)
POTASSIUM SERPL-SCNC: 4.2 MMOL/L (ref 3.5–5.3)
SODIUM SERPL-SCNC: 139 MMOL/L (ref 136–145)

## 2017-06-02 PROCEDURE — 80048 BASIC METABOLIC PNL TOTAL CA: CPT | Performed by: PHYSICIAN ASSISTANT

## 2017-06-02 PROCEDURE — 82948 REAGENT STRIP/BLOOD GLUCOSE: CPT

## 2017-06-02 RX ORDER — CLOPIDOGREL BISULFATE 75 MG/1
75 TABLET ORAL DAILY
Qty: 30 TABLET | Refills: 0 | Status: SHIPPED | OUTPATIENT
Start: 2017-06-02

## 2017-06-02 RX ADMIN — METOPROLOL TARTRATE 25 MG: 25 TABLET ORAL at 08:43

## 2017-06-02 RX ADMIN — ACETAMINOPHEN 650 MG: 325 TABLET, FILM COATED ORAL at 06:15

## 2017-06-02 RX ADMIN — INSULIN LISPRO 1 UNITS: 100 INJECTION, SOLUTION INTRAVENOUS; SUBCUTANEOUS at 06:14

## 2017-06-02 RX ADMIN — GUAIFENESIN 600 MG: 600 TABLET, EXTENDED RELEASE ORAL at 08:43

## 2017-06-02 RX ADMIN — CLOPIDOGREL BISULFATE 75 MG: 75 TABLET ORAL at 08:43

## 2017-06-02 RX ADMIN — INSULIN LISPRO 1 UNITS: 100 INJECTION, SOLUTION INTRAVENOUS; SUBCUTANEOUS at 10:53

## 2017-06-02 RX ADMIN — ASPIRIN 81 MG: 81 TABLET, CHEWABLE ORAL at 08:43

## 2017-06-20 ENCOUNTER — GENERIC CONVERSION - ENCOUNTER (OUTPATIENT)
Dept: OTHER | Facility: OTHER | Age: 62
End: 2017-06-20

## 2022-04-14 VITALS
SYSTOLIC BLOOD PRESSURE: 168 MMHG | HEART RATE: 81 BPM | OXYGEN SATURATION: 96 % | TEMPERATURE: 98 F | HEIGHT: 66.14 IN | RESPIRATION RATE: 16 BRPM | DIASTOLIC BLOOD PRESSURE: 76 MMHG | WEIGHT: 188.05 LBS

## 2022-04-14 RX ORDER — CHLORHEXIDINE GLUCONATE 213 G/1000ML
1 SOLUTION TOPICAL ONCE
Refills: 0 | Status: DISCONTINUED | OUTPATIENT
Start: 2022-05-23 | End: 2022-05-23

## 2022-04-14 NOTE — H&P ADULT - ASSESSMENT
Pt is a 65 y/o M Cantonese speaking, POOR HISTORIAN with PMHx ?HIV/hepatitis previously on Viread - has not filled recently, patient unable to confirm or deny, of HTN, HLD, DM2, CAD (s/p CABG x 2, LIMA-LAD and SVG-rRPDA at [facility] 08/17), CKD stage IV (baseline unknown, Cr 2.54 on admission, Cr 2.8 5/18 OP labs), bilateral carotid artery stenosis (cath 8/12 b/l ICA ; duplex 12/16: occluded b/l ICA and moderate lt CCA), CVA with residual left sided weakness, PAD (s/p multiple peripheral caths in past - p/cath 8/15: PTA rt SFA, popliteal and TPT/PERONEAL ; p/cah 10/16: PTA left CFA, SFA , popliteal, PT/TPT, p/cath 3/17: PTA CFA, SFA, TPT/PTA; p/cath 5/17: PTA right SFA/popliteal/TPT  and PTA; p/cath 2/20: PTA right SFA, popliteal, TPT, and PTA; p/cath 12/20: PTA left SFA and PTA; p/cath 5/21: PTA right SFA, popliteal and PTA) (on Xarelto 2.5mg BID, last dose yesterday), ischemic cardiomyopathy (EF now recovered, EF: 46% as per echo 12/24/21) who presents for peripheral angiogram due to known PAD with multiple prior interventions, abnormal MAGALI and arterial duplex, and [place] class 2-3 symptoms.     Risks & benefits of procedure and alternative therapy have been explained to the patient including but not limited to: allergic reaction, bleeding w/possible need for blood transfusion, infection, renal and vascular compromise, limb damage, stroke, Myocardial infarction, vessel dissection/perforation, emergent Vascular Surgery. Informed consent obtained and in chart.    ASA III Mallampati III    Patient is appropriate for moderate sedation.    H/H 9.9/31.0, anemic likely secondary to CKD.   OP labs - Hgb 9.3 (5/18/22), patient denies bright red blood per rectum, melena, hematochezia, hematuria.  Patient currently takes Plavix 75mg daily last dose 5/22/22. Plavix 75mg PO x 1, ASA 325mg PO x 1 given prior to procedure  CKD stage IV, baseline Cr 2.54 on admission, Cr 2.8 5/18 OP labs.  EF 46% by echo 2/13/22. Patient euvolemic on exam, fluids per hydration protocol, 250cc bolus followed by 75cc/hour x 2 hours.  Fellow Dr. Olivia Dodge made aware of all above. Pt is a 67 y/o M Cantonese speaking, POOR HISTORIAN with PMHx ?HIV/hepatitis previously on Viread - has not filled recently, patient unable to confirm or deny,) HTN, HLD, DM2, CAD (s/p CABG x 2, LIMA-LAD and SVG-rRPDA at [facility] 08/17), CKD stage IV (baseline unknown, Cr 2.54 on admission, Cr 2.8 5/18/22 OP labs), bilateral carotid artery stenosis (cath 8/12 b/l ICA ; duplex 12/16: occluded b/l ICA and moderate lt CCA), CVA with residual left sided weakness, PAD (s/p multiple peripheral caths in past - p/cath 8/15: PTA rt SFA, popliteal and TPT/PERONEAL ; p/cah 10/16: PTA left CFA, SFA , popliteal, PT/TPT, p/cath 3/17: PTA CFA, SFA, TPT/PTA; p/cath 5/17: PTA right SFA/popliteal/TPT  and PTA; p/cath 2/20: PTA right SFA, popliteal, TPT, and PTA; p/cath 12/20: PTA left SFA and PTA; p/cath 5/21: PTA right SFA, popliteal and PTA) (on Xarelto 2.5mg BID, last dose yesterday), ischemic cardiomyopathy (EF now recovered, EF: 46% as per echo 12/24/21) who presents for peripheral angiogram due to known PAD with multiple prior interventions, abnormal MAGALI and arterial duplex, and [place] class 2-3 symptoms.     Risks & benefits of procedure and alternative therapy have been explained to the patient including but not limited to: allergic reaction, bleeding w/possible need for blood transfusion, infection, renal and vascular compromise, limb damage, stroke, Myocardial infarction, vessel dissection/perforation, emergent Vascular Surgery. Informed consent obtained and in chart.    ASA III Mallampati III    Patient is appropriate for moderate sedation.    Hgb/HCT 9.9/31.0, anemic likely secondary to CKD.   OP labs - Hgb 9.3 (5/18/22), patient denies bright red blood per rectum, melena, hematochezia, hematuria.  Patient currently takes Plavix 75mg daily last dose 5/22/22. Plavix 75mg PO x 1, ASA 325mg PO x 1 given prior to procedure  CKD stage IV, baseline Cr 2.54 on admission, Cr 2.8 5/18 OP labs.  EF 46% by echo 2/13/22. Patient euvolemic on exam, fluids per hydration protocol, 250cc bolus followed by 75cc/hour x 2 hours.  Fellow Dr. Olivia Dodge made aware of all above.

## 2022-04-14 NOTE — H&P ADULT - HISTORY OF PRESENT ILLNESS
[facility] CARDIOLOGY [facility]    Cardiologist: Dr. Zion Mario   Pharmacy:  Pharmacy  Escort: confirm upon arrival   COVID:     confirm medications upon arrival     Pt is a 67 y/o M, with PMHx of HTN, HLD, DM, CAD (s/p CABG x 2, LIMA-LAD and SVG-rRPDA at [facility] 08/17), bilateral carotid artery stenosis (cath 8/12 b/l ICA ; duplex 12/16: occluded b/l ICA and moderate lt CCA), PAD (s/p multiple peripheral caths in past - p/cath 8/15: PTA rt SFA, popliteal and TPT/PERONEAL ; p/cah 10/16: PTA left CFA, SFA , popliteal, PT/TPT, p/cath 3/17: PTA CFA, SFA, TPT/PTA; p/cath 5/17: PTA right SFA/popliteal/TPT  and PTA; p/cath 2/20: PTA right SFA, popliteal, TPT, and PTA; p/cath 12/20: PTA left SFA and PTA; p/cath 5/21: PTA right SFA, popliteal and PTA), ischemic cardiomyopathy (EF now recovered, EF: 46% as per echo 12/24/21), who presented to his cardiologist, Dr. Vela, with complaints of bilateral gradually worsening leg pain, heaviness, weakness, of his legs precipitated by ambulation and promptly resolves with rest. Patient can only walk < 1 block or < 1 flight of stairs.     ECHO 02/13/22: Moderately LA. Normal LV/RV. Normal RA. Moderate concentric LVH. Grade I DD. Aortic valve stenosis. Trace AR. EF: 46%. MAGALI 01/14/22: Resting MAGALI was mild (0.70-0.89); Post-Exercise MAGALI was severe (<.50); TBI was normal (>0.70), Left-sided findings: Resting MAGALI was moderate (0.51-0.69); Post-Exercise MAGALI was severe (<0.50); TBI was mild/moderate (0.41-0.70). LE Arterial Duplex 01/25/22: Evidence of calcified plaque in the R pSFA and mSFA. Moderate stenosis (50-75%) stenosis in the b/l common femoral artery, distal superficial femoral artery, and popliteal artery. Evidence of severe (75-99%) stenosis in the right mSFA. Evidence of occluded stenosis in the R pTA and anterior tibial artery. Evidence of moderate (50-75) stenosis in the left deep femoral artery, superficial femoral artery, and distal posterior tibial artery. Evidence of severe (75-99%) stenosis in the left middle posterior tibial artery. Evidence of occluded stenosis in the left anterior tibial artery.     In light of patient's risk factors, [place] Class , and abnormal MAGALI/Duplex patient to undergo peripheral catheterization with possible intervention if clinically indicated.  [facility] CARDIOLOGY [facility]    Cardiologist: Dr. Zion Mario   Pharmacy:  Pharmacy  Escort: confirm upon arrival   COVID: 5/18/22 negative (copy in chart)    confirm medications upon arrival     Pt is a 67 y/o M, with PMHx of HTN, HLD, DM, CAD (s/p CABG x 2, LIMA-LAD and SVG-rRPDA at [facility] 08/17), bilateral carotid artery stenosis (cath 8/12 b/l ICA ; duplex 12/16: occluded b/l ICA and moderate lt CCA), PAD (s/p multiple peripheral caths in past - p/cath 8/15: PTA rt SFA, popliteal and TPT/PERONEAL ; p/cah 10/16: PTA left CFA, SFA , popliteal, PT/TPT, p/cath 3/17: PTA CFA, SFA, TPT/PTA; p/cath 5/17: PTA right SFA/popliteal/TPT  and PTA; p/cath 2/20: PTA right SFA, popliteal, TPT, and PTA; p/cath 12/20: PTA left SFA and PTA; p/cath 5/21: PTA right SFA, popliteal and PTA), ischemic cardiomyopathy (EF now recovered, EF: 46% as per echo 12/24/21), who presented to his cardiologist, Dr. Vela, with complaints of bilateral gradually worsening leg pain, heaviness, weakness, of his legs precipitated by ambulation and promptly resolves with rest. Patient can only walk < 1 block or < 1 flight of stairs.     ECHO 02/13/22: Moderately LA. Normal LV/RV. Normal RA. Moderate concentric LVH. Grade I DD. Aortic valve stenosis. Trace AR. EF: 46%. MAGALI 01/14/22: Resting MAGALI was mild (0.70-0.89); Post-Exercise MAGALI was severe (<.50); TBI was normal (>0.70), Left-sided findings: Resting MAGALI was moderate (0.51-0.69); Post-Exercise MAGALI was severe (<0.50); TBI was mild/moderate (0.41-0.70). LE Arterial Duplex 01/25/22: Evidence of calcified plaque in the R pSFA and mSFA. Moderate stenosis (50-75%) stenosis in the b/l common femoral artery, distal superficial femoral artery, and popliteal artery. Evidence of severe (75-99%) stenosis in the right mSFA. Evidence of occluded stenosis in the R pTA and anterior tibial artery. Evidence of moderate (50-75) stenosis in the left deep femoral artery, superficial femoral artery, and distal posterior tibial artery. Evidence of severe (75-99%) stenosis in the left middle posterior tibial artery. Evidence of occluded stenosis in the left anterior tibial artery.     In light of patient's risk factors, [place] Class , and abnormal MAGALI/Duplex patient to undergo peripheral catheterization with possible intervention if clinically indicated.  [facility] CARDIOLOGY [facility]    Cardiologist: Dr. Zion Mario   Pharmacy: [facility]  - meds obtained from pharmacy  Escort: confirm upon arrival   COVID: 5/18/22 negative (copy in chart)    Pt is a 65 y/o M Cantonese speaking, POOR HISTORIAN with PMHx of ?HIV/hepatitis previously on Viread - has not filled recently, patient unable to confirm or deny, HTN, HLD, DM2, CAD (s/p CABG x 2, LIMA-LAD and SVG-rRPDA at [facility] 08/17), CKD stage IV (baseline unknown, Cr 2.54 on admission, Cr 2.8 5/18 OP labs), bilateral carotid artery stenosis (cath 8/12 b/l ICA ; duplex 12/16: occluded b/l ICA and moderate lt CCA), CVA with residual left sided weakness, PAD (s/p multiple peripheral caths in past - p/cath 8/15: PTA rt SFA, popliteal and TPT/PERONEAL ; p/cah 10/16: PTA left CFA, SFA , popliteal, PT/TPT, p/cath 3/17: PTA CFA, SFA, TPT/PTA; p/cath 5/17: PTA right SFA/popliteal/TPT  and PTA; p/cath 2/20: PTA right SFA, popliteal, TPT, and PTA; p/cath 12/20: PTA left SFA and PTA; p/cath 5/21: PTA right SFA, popliteal and PTA) (on Xarelto 2.5mg BID, last dose yesterday), ischemic cardiomyopathy (EF now recovered, EF: 46% as per echo 12/24/21), who presented to his cardiologist, Dr. Vela, with complaints of bilateral gradually worsening leg pain, heaviness, weakness, of his legs precipitated by ambulation and promptly resolves with rest. Patient can only walk < 1 block or < 1 flight of stairs.     ECHO 02/13/22: Moderately LA. Normal LV/RV. Normal RA. Moderate concentric LVH. Grade I DD. Aortic valve stenosis. Trace AR. EF: 46%. MAGALI 01/14/22: Resting MAGALI was mild (0.70-0.89); Post-Exercise MAGALI was severe (<.50); TBI was normal (>0.70), Left-sided findings: Resting MAGALI was moderate (0.51-0.69); Post-Exercise MAGALI was severe (<0.50); TBI was mild/moderate (0.41-0.70). LE Arterial Duplex 01/25/22: Evidence of calcified plaque in the R pSFA and mSFA. Moderate stenosis (50-75%) stenosis in the b/l common femoral artery, distal superficial femoral artery, and popliteal artery. Evidence of severe (75-99%) stenosis in the right mSFA. Evidence of occluded stenosis in the R pTA and anterior tibial artery. Evidence of moderate (50-75) stenosis in the left deep femoral artery, superficial femoral artery, and distal posterior tibial artery. Evidence of severe (75-99%) stenosis in the left middle posterior tibial artery. Evidence of occluded stenosis in the left anterior tibial artery.     In light of patient's risk factors, [place] Class , and abnormal MAGALI/Duplex patient to undergo peripheral catheterization with possible intervention if clinically indicated.      History obtained from [facility] Cardiology office notes    Cardiologist: Dr. Zion Mario   Pharmacy: [facility]  - meds obtained from pharmacy and cross referenced with Surescripts  Escort: confirm upon arrival   COVID: 5/18/22 negative (copy in chart)    Pt is a 65 y/o M Cantonese speaking, POOR HISTORIAN with PMHx of ?HIV/hepatitis (patient unable to confirm or deny-previously on Viread - has not filled recently)  HTN, HLD, DM2, CAD (s/p CABG x 2, LIMA-LAD and SVG-rRPDA at [facility] 08/17), CKD stage IV (baseline unknown, Cr 2.54 on admission, Cr 2.8 5/18 OP labs), bilateral carotid artery stenosis (cath 8/12 b/l ICA ; duplex 12/16: occluded b/l ICA and moderate lt CCA), CVA with residual left sided weakness, PAD (s/p multiple peripheral caths in past - p/cath 8/15: PTA rt SFA, popliteal and TPT/PERONEAL ; p/cah 10/16: PTA left CFA, SFA , popliteal, PT/TPT, p/cath 3/17: PTA CFA, SFA, TPT/PTA; p/cath 5/17: PTA right SFA/popliteal/TPT  and PTA; p/cath 2/20: PTA right SFA, popliteal, TPT, and PTA; p/cath 12/20: PTA left SFA and PTA; p/cath 5/21: PTA right SFA, popliteal and PTA) (on Xarelto 2.5mg BID, last dose yesterday), ischemic cardiomyopathy (EF now recovered, EF: 46% as per echo 12/24/21), who presented to his cardiologist, Dr. Vela, with complaints of bilateral gradually worsening leg pain, heaviness, weakness, of his legs precipitated by ambulation and promptly resolves with rest. Patient can only walk < 1 block or < 1 flight of stairs.     ECHO 02/13/22: Moderately LA. Normal LV/RV. Normal RA. Moderate concentric LVH. Grade I DD. Aortic valve stenosis. Trace AR. EF: 46%. MAGALI 01/14/22: Resting MAGALI was mild (0.70-0.89); Post-Exercise MAGALI was severe (<.50); TBI was normal (>0.70), Left-sided findings: Resting MAGALI was moderate (0.51-0.69); Post-Exercise MAGALI was severe (<0.50); TBI was mild/moderate (0.41-0.70). LE Arterial Duplex 01/25/22: Evidence of calcified plaque in the R pSFA and mSFA. Moderate stenosis (50-75%) stenosis in the b/l common femoral artery, distal superficial femoral artery, and popliteal artery. Evidence of severe (75-99%) stenosis in the right mSFA. Evidence of occluded stenosis in the R pTA and anterior tibial artery. Evidence of moderate (50-75) stenosis in the left deep femoral artery, superficial femoral artery, and distal posterior tibial artery. Evidence of severe (75-99%) stenosis in the left middle posterior tibial artery. Evidence of occluded stenosis in the left anterior tibial artery.     In light of patient's risk factors, [place] Class 2-3 , and abnormal MAGALI/Duplex patient to undergo peripheral catheterization with possible intervention if clinically indicated.

## 2022-04-14 NOTE — H&P ADULT - NSHPLABSRESULTS_GEN_ALL_CORE
9.9    6.68  )-----------( 302      ( 23 May 2022 13:40 )             31.0       05-23    138  |  103  |  x   ----------------------------<  x   3.6   |  x   |  x           PT/INR - ( 23 May 2022 13:40 )   PT: 12.3 sec;   INR: 1.03          PTT - ( 23 May 2022 13:40 )  PTT:30.5 sec      EKG: NSR 80 bpm, first degree AV block, 1mm ST depression in V6. 1mm J point elevation V3. T wave inversion leads I, aVL, V6. Prolonged QT at 463. 9.9    6.68  )-----------( 302      ( 23 May 2022 13:40 )             31.0       05-23    138  |  103  |  26<H>  ----------------------------<  109<H>  3.6   |  23  |  2.54<H>    Ca    9.3      23 May 2022 13:40    TPro  8.6<H>  /  Alb  4.0  /  TBili  0.4  /  DBili  x   /  AST  22  /  ALT  15  /  AlkPhos  67  05-23      PT/INR - ( 23 May 2022 13:40 )   PT: 12.3 sec;   INR: 1.03          PTT - ( 23 May 2022 13:40 )  PTT:30.5 sec    CARDIAC MARKERS ( 23 May 2022 13:40 )  x     / x     / 84 U/L / x     / 2.0 ng/mL      EKG: NSR 80 bpm, first degree AV block, 1mm ST depression in V6. 1mm J point elevation V3. T wave inversion leads I, aVL, V6. Prolonged QT at 463.

## 2022-05-23 ENCOUNTER — INPATIENT (INPATIENT)
Facility: HOSPITAL | Age: 67
LOS: 0 days | Discharge: HOME | DRG: 253 | End: 2022-05-24
Attending: INTERNAL MEDICINE | Admitting: INTERNAL MEDICINE
Payer: MEDICARE

## 2022-05-23 LAB
A1C WITH ESTIMATED AVERAGE GLUCOSE RESULT: 6.5 % — HIGH (ref 4–5.6)
ALBUMIN SERPL ELPH-MCNC: 4 G/DL — SIGNIFICANT CHANGE UP (ref 3.3–5)
ALP SERPL-CCNC: 67 U/L — SIGNIFICANT CHANGE UP (ref 40–120)
ALT FLD-CCNC: 15 U/L — SIGNIFICANT CHANGE UP (ref 10–45)
ANION GAP SERPL CALC-SCNC: 12 MMOL/L — SIGNIFICANT CHANGE UP (ref 5–17)
ANISOCYTOSIS BLD QL: SLIGHT — SIGNIFICANT CHANGE UP
APTT BLD: 30.5 SEC — SIGNIFICANT CHANGE UP (ref 27.5–35.5)
AST SERPL-CCNC: 22 U/L — SIGNIFICANT CHANGE UP (ref 10–40)
BASOPHILS # BLD AUTO: 0.06 K/UL — SIGNIFICANT CHANGE UP (ref 0–0.2)
BASOPHILS NFR BLD AUTO: 0.9 % — SIGNIFICANT CHANGE UP (ref 0–2)
BILIRUB SERPL-MCNC: 0.4 MG/DL — SIGNIFICANT CHANGE UP (ref 0.2–1.2)
BLASTS # FLD: 0.9 % — HIGH (ref 0–0)
BUN SERPL-MCNC: 26 MG/DL — HIGH (ref 7–23)
CALCIUM SERPL-MCNC: 9.3 MG/DL — SIGNIFICANT CHANGE UP (ref 8.4–10.5)
CHLORIDE SERPL-SCNC: 103 MMOL/L — SIGNIFICANT CHANGE UP (ref 96–108)
CHOLEST SERPL-MCNC: 106 MG/DL — SIGNIFICANT CHANGE UP
CK MB CFR SERPL CALC: 2 NG/ML — SIGNIFICANT CHANGE UP (ref 0–6.7)
CK SERPL-CCNC: 84 U/L — SIGNIFICANT CHANGE UP (ref 30–200)
CO2 SERPL-SCNC: 23 MMOL/L — SIGNIFICANT CHANGE UP (ref 22–31)
CREAT SERPL-MCNC: 2.54 MG/DL — HIGH (ref 0.5–1.3)
DACRYOCYTES BLD QL SMEAR: SLIGHT — SIGNIFICANT CHANGE UP
EGFR: 27 ML/MIN/1.73M2 — LOW
EOSINOPHIL # BLD AUTO: 0.96 K/UL — HIGH (ref 0–0.5)
EOSINOPHIL NFR BLD AUTO: 14.4 % — HIGH (ref 0–6)
ESTIMATED AVERAGE GLUCOSE: 140 MG/DL — HIGH (ref 68–114)
GIANT PLATELETS BLD QL SMEAR: PRESENT — SIGNIFICANT CHANGE UP
GLUCOSE BLDC GLUCOMTR-MCNC: 112 MG/DL — HIGH (ref 70–99)
GLUCOSE BLDC GLUCOMTR-MCNC: 117 MG/DL — HIGH (ref 70–99)
GLUCOSE BLDC GLUCOMTR-MCNC: 138 MG/DL — HIGH (ref 70–99)
GLUCOSE SERPL-MCNC: 109 MG/DL — HIGH (ref 70–99)
HCT VFR BLD CALC: 31 % — LOW (ref 39–50)
HDLC SERPL-MCNC: 38 MG/DL — LOW
HGB BLD-MCNC: 9.9 G/DL — LOW (ref 13–17)
HYPOCHROMIA BLD QL: SLIGHT — SIGNIFICANT CHANGE UP
INR BLD: 1.03 — SIGNIFICANT CHANGE UP (ref 0.88–1.16)
LIPID PNL WITH DIRECT LDL SERPL: 39 MG/DL — SIGNIFICANT CHANGE UP
LYMPHOCYTES # BLD AUTO: 1.74 K/UL — SIGNIFICANT CHANGE UP (ref 1–3.3)
LYMPHOCYTES # BLD AUTO: 26.1 % — SIGNIFICANT CHANGE UP (ref 13–44)
MACROCYTES BLD QL: SLIGHT — SIGNIFICANT CHANGE UP
MANUAL SMEAR VERIFICATION: SIGNIFICANT CHANGE UP
MCHC RBC-ENTMCNC: 26.4 PG — LOW (ref 27–34)
MCHC RBC-ENTMCNC: 31.9 GM/DL — LOW (ref 32–36)
MCV RBC AUTO: 82.7 FL — SIGNIFICANT CHANGE UP (ref 80–100)
METAMYELOCYTES # FLD: 0.9 % — HIGH (ref 0–0)
MICROCYTES BLD QL: SLIGHT — SIGNIFICANT CHANGE UP
MONOCYTES # BLD AUTO: 0.06 K/UL — SIGNIFICANT CHANGE UP (ref 0–0.9)
MONOCYTES NFR BLD AUTO: 0.9 % — LOW (ref 2–14)
MYELOCYTES NFR BLD: 0.9 % — HIGH (ref 0–0)
NEUTROPHILS # BLD AUTO: 3.67 K/UL — SIGNIFICANT CHANGE UP (ref 1.8–7.4)
NEUTROPHILS NFR BLD AUTO: 55 % — SIGNIFICANT CHANGE UP (ref 43–77)
NON HDL CHOLESTEROL: 68 MG/DL — SIGNIFICANT CHANGE UP
OVALOCYTES BLD QL SMEAR: SLIGHT — SIGNIFICANT CHANGE UP
PLAT MORPH BLD: ABNORMAL
PLATELET # BLD AUTO: 302 K/UL — SIGNIFICANT CHANGE UP (ref 150–400)
POIKILOCYTOSIS BLD QL AUTO: SIGNIFICANT CHANGE UP
POLYCHROMASIA BLD QL SMEAR: SLIGHT — SIGNIFICANT CHANGE UP
POTASSIUM SERPL-MCNC: 3.6 MMOL/L — SIGNIFICANT CHANGE UP (ref 3.5–5.3)
POTASSIUM SERPL-SCNC: 3.6 MMOL/L — SIGNIFICANT CHANGE UP (ref 3.5–5.3)
PROT SERPL-MCNC: 8.6 G/DL — HIGH (ref 6–8.3)
PROTHROM AB SERPL-ACNC: 12.3 SEC — SIGNIFICANT CHANGE UP (ref 10.5–13.4)
RBC # BLD: 3.75 M/UL — LOW (ref 4.2–5.8)
RBC # FLD: 16.5 % — HIGH (ref 10.3–14.5)
RBC BLD AUTO: ABNORMAL
SCHISTOCYTES BLD QL AUTO: SLIGHT — SIGNIFICANT CHANGE UP
SODIUM SERPL-SCNC: 138 MMOL/L — SIGNIFICANT CHANGE UP (ref 135–145)
SPHEROCYTES BLD QL SMEAR: SLIGHT — SIGNIFICANT CHANGE UP
TRIGL SERPL-MCNC: 146 MG/DL — SIGNIFICANT CHANGE UP
WBC # BLD: 6.68 K/UL — SIGNIFICANT CHANGE UP (ref 3.8–10.5)
WBC # FLD AUTO: 6.68 K/UL — SIGNIFICANT CHANGE UP (ref 3.8–10.5)

## 2022-05-23 PROCEDURE — 37221: CPT

## 2022-05-23 PROCEDURE — 75716 ARTERY X-RAYS ARMS/LEGS: CPT | Mod: 26,XU

## 2022-05-23 RX ORDER — CARVEDILOL PHOSPHATE 80 MG/1
25 CAPSULE, EXTENDED RELEASE ORAL EVERY 12 HOURS
Refills: 0 | Status: DISCONTINUED | OUTPATIENT
Start: 2022-05-23 | End: 2022-05-24

## 2022-05-23 RX ORDER — SODIUM CHLORIDE 9 MG/ML
250 INJECTION INTRAMUSCULAR; INTRAVENOUS; SUBCUTANEOUS ONCE
Refills: 0 | Status: COMPLETED | OUTPATIENT
Start: 2022-05-23 | End: 2022-05-23

## 2022-05-23 RX ORDER — HYDRALAZINE HCL 50 MG
50 TABLET ORAL THREE TIMES A DAY
Refills: 0 | Status: DISCONTINUED | OUTPATIENT
Start: 2022-05-23 | End: 2022-05-24

## 2022-05-23 RX ORDER — ATORVASTATIN CALCIUM 80 MG/1
40 TABLET, FILM COATED ORAL AT BEDTIME
Refills: 0 | Status: DISCONTINUED | OUTPATIENT
Start: 2022-05-23 | End: 2022-05-24

## 2022-05-23 RX ORDER — RIVAROXABAN 15 MG-20MG
2.5 KIT ORAL
Refills: 0 | Status: DISCONTINUED | OUTPATIENT
Start: 2022-05-23 | End: 2022-05-24

## 2022-05-23 RX ORDER — AMLODIPINE BESYLATE 2.5 MG/1
10 TABLET ORAL DAILY
Refills: 0 | Status: DISCONTINUED | OUTPATIENT
Start: 2022-05-23 | End: 2022-05-24

## 2022-05-23 RX ORDER — CLOPIDOGREL BISULFATE 75 MG/1
75 TABLET, FILM COATED ORAL ONCE
Refills: 0 | Status: COMPLETED | OUTPATIENT
Start: 2022-05-23 | End: 2022-05-23

## 2022-05-23 RX ORDER — SODIUM CHLORIDE 9 MG/ML
500 INJECTION INTRAMUSCULAR; INTRAVENOUS; SUBCUTANEOUS
Refills: 0 | Status: DISCONTINUED | OUTPATIENT
Start: 2022-05-23 | End: 2022-05-24

## 2022-05-23 RX ORDER — NITROGLYCERIN 6.5 MG
1 CAPSULE, EXTENDED RELEASE ORAL
Qty: 0 | Refills: 0 | DISCHARGE

## 2022-05-23 RX ORDER — SODIUM CHLORIDE 9 MG/ML
1000 INJECTION INTRAMUSCULAR; INTRAVENOUS; SUBCUTANEOUS
Refills: 0 | Status: DISCONTINUED | OUTPATIENT
Start: 2022-05-23 | End: 2022-05-23

## 2022-05-23 RX ORDER — ALLOPURINOL 300 MG
1 TABLET ORAL
Qty: 0 | Refills: 0 | DISCHARGE

## 2022-05-23 RX ORDER — INSULIN LISPRO 100/ML
VIAL (ML) SUBCUTANEOUS
Refills: 0 | Status: DISCONTINUED | OUTPATIENT
Start: 2022-05-23 | End: 2022-05-24

## 2022-05-23 RX ORDER — SODIUM CHLORIDE 9 MG/ML
500 INJECTION INTRAMUSCULAR; INTRAVENOUS; SUBCUTANEOUS
Refills: 0 | Status: DISCONTINUED | OUTPATIENT
Start: 2022-05-23 | End: 2022-05-23

## 2022-05-23 RX ORDER — ALLOPURINOL 300 MG
200 TABLET ORAL DAILY
Refills: 0 | Status: DISCONTINUED | OUTPATIENT
Start: 2022-05-23 | End: 2022-05-24

## 2022-05-23 RX ORDER — ASPIRIN/CALCIUM CARB/MAGNESIUM 324 MG
325 TABLET ORAL ONCE
Refills: 0 | Status: COMPLETED | OUTPATIENT
Start: 2022-05-23 | End: 2022-05-23

## 2022-05-23 RX ORDER — PIOGLITAZONE HYDROCHLORIDE 15 MG/1
1 TABLET ORAL
Qty: 0 | Refills: 0 | DISCHARGE

## 2022-05-23 RX ORDER — CLOPIDOGREL BISULFATE 75 MG/1
75 TABLET, FILM COATED ORAL DAILY
Refills: 0 | Status: DISCONTINUED | OUTPATIENT
Start: 2022-05-24 | End: 2022-05-24

## 2022-05-23 RX ORDER — TENOFOVIR DISOPROXIL FUMARATE 300 MG/1
1 TABLET, FILM COATED ORAL
Qty: 0 | Refills: 0 | DISCHARGE

## 2022-05-23 RX ADMIN — SODIUM CHLORIDE 75 MILLILITER(S): 9 INJECTION INTRAMUSCULAR; INTRAVENOUS; SUBCUTANEOUS at 14:30

## 2022-05-23 RX ADMIN — CLOPIDOGREL BISULFATE 75 MILLIGRAM(S): 75 TABLET, FILM COATED ORAL at 14:30

## 2022-05-23 RX ADMIN — Medication 50 MILLIGRAM(S): at 16:45

## 2022-05-23 RX ADMIN — SODIUM CHLORIDE 100 MILLILITER(S): 9 INJECTION INTRAMUSCULAR; INTRAVENOUS; SUBCUTANEOUS at 16:47

## 2022-05-23 RX ADMIN — Medication 325 MILLIGRAM(S): at 14:30

## 2022-05-23 RX ADMIN — Medication 50 MILLIGRAM(S): at 22:12

## 2022-05-23 RX ADMIN — Medication 1 TABLET(S): at 23:05

## 2022-05-23 RX ADMIN — ATORVASTATIN CALCIUM 40 MILLIGRAM(S): 80 TABLET, FILM COATED ORAL at 22:12

## 2022-05-23 RX ADMIN — AMLODIPINE BESYLATE 10 MILLIGRAM(S): 2.5 TABLET ORAL at 16:34

## 2022-05-23 RX ADMIN — SODIUM CHLORIDE 500 MILLILITER(S): 9 INJECTION INTRAMUSCULAR; INTRAVENOUS; SUBCUTANEOUS at 14:30

## 2022-05-23 RX ADMIN — RIVAROXABAN 2.5 MILLIGRAM(S): KIT at 23:05

## 2022-05-23 RX ADMIN — Medication 200 MILLIGRAM(S): at 22:13

## 2022-05-23 RX ADMIN — CARVEDILOL PHOSPHATE 25 MILLIGRAM(S): 80 CAPSULE, EXTENDED RELEASE ORAL at 16:44

## 2022-05-23 NOTE — PROGRESS NOTE ADULT - SUBJECTIVE AND OBJECTIVE BOX
Interventional Cardiology PA Sheath Pull Note    Pt without complaints. VSS      Pre-Sheath Removal:    [ ] Right     [X ] Left          [ ] Groin    [ ] Brachial    [ ] 5Fr      [ ] 6Fr    [X ] 7Fr     [ ] 8Fr    [X ] Arterial  [ ] Venous sheath in place    [ ] Hematoma        [ ] Bleed    Pulses:    [ ] Right     [ ] Left       DP:  [X ]  Doppler   [ ]  Faint    [ ]   1+    [ ] 2+       Hemostasis Achieved with:          23       minutes manual pressure    Vasovagal Reaction: No    Meds Given: none      Post-Sheath Removal:    [ ] Right     [X ] Left          [ X] Groin    [ ] Brachial    [ ] Hematoma        [ ] Bleed       [ ] Bruit    Pulses:    [ ] Right     [ ] Left       DP:  [X ]  Doppler   [ ]  Faint    [ ]   1+    [ ] 2+     A/P:  s/p [ ] Dx Cath      [ ] IVUS/FFR     [ X] PTA/STENT s/p peripheral intervention      [ ] PTCA/STENT    -Continue bedrest (pt given instructions)  -Continue to monitor

## 2022-05-23 NOTE — PATIENT PROFILE ADULT - FALL HARM RISK - HARM RISK INTERVENTIONS

## 2022-05-23 NOTE — PATIENT PROFILE ADULT - DO YOU FEEL LIKE HURTING YOURSELF OR OTHERS?
Patient with positive UA, in the setting of recent procedure, suspicion for infection.   - Given CTX 1g in ED.  - U Cx and blood cx negative  - Continue with CTX 1g IV, advance to 2g if abscess or bacteremic Baseline Cr from 2019 ~0.9-1.0 per chart review  - Steadily increasing Cr of unknown etiology --> 3.52 07/01 @ 11pm; 3.88 07/02; Cr elevation persisted for >72 hrs, treating as ATN  - Euvolemic on exam with only slight hyperosmolar urine with increased urine Na  - Differential remains wide; Renal consider nuclear scan for persisting ATN with hyponatremia  -STAT CT A/P performed 07/02 per nephrology and urology to r/o post-renal etiology -- severe right hydronephrosis with 2 mm UPJ stone, moderate L hydronephrosis stable, L uretal double-J stent in place and stable. No interval change in bilateral hydronephrosis.   -Per Nephrology 07/03- conern remains for possible obstructive component given increase in hydronephrosis on L seen during imaging. Patient's Cr plateaud which is reassuring -- follow up PM BMP, if Cr continues to trend up, consider PCN with urology re-eval   -Nuclear med scan needed   -Patient to undergo nuclear medicine renal scan -- f/u results Baseline Cr from 2019 ~0.9-1.0 per chart review  - Steadily increasing Cr of unknown etiology --> 3.52 07/01 @ 11pm; 3.88 07/02; Cr elevation persisted for >72 hrs, treating as ATN  - Euvolemic on exam with only slight hyperosmolar urine with increased urine Na  - Differential remains wide; Renal consider nuclear scan for persisting ATN with hyponatremia  -STAT CT A/P performed 07/02 per nephrology and urology to r/o post-renal etiology -- severe right hydronephrosis with 2 mm UPJ stone, moderate L hydronephrosis stable, L uretal double-J stent in place and stable. No interval change in bilateral hydronephrosis.   -Per Nephrology 07/03- conern remains for possible obstructive component given increase in hydronephrosis on L seen during imaging. Patient's Cr plateaud which is reassuring -- follow up PM BMP, if Cr continues to trend up, consider PCN with urology re-eval   -Per Nephorlogy, patient given 15 g Urea one time dose, 200 IV Iron mg qd x 5 days (start date 07/03). Repeat BMP and Mg at 6pm  -Nuclear med scan needed   -Patient to undergo nuclear medicine renal scan -- f/u results no

## 2022-05-24 VITALS — TEMPERATURE: 97 F

## 2022-05-24 LAB
ANION GAP SERPL CALC-SCNC: 11 MMOL/L — SIGNIFICANT CHANGE UP (ref 5–17)
BUN SERPL-MCNC: 28 MG/DL — HIGH (ref 7–23)
CALCIUM SERPL-MCNC: 8.7 MG/DL — SIGNIFICANT CHANGE UP (ref 8.4–10.5)
CHLORIDE SERPL-SCNC: 103 MMOL/L — SIGNIFICANT CHANGE UP (ref 96–108)
CO2 SERPL-SCNC: 22 MMOL/L — SIGNIFICANT CHANGE UP (ref 22–31)
CREAT SERPL-MCNC: 2.2 MG/DL — HIGH (ref 0.5–1.3)
EGFR: 32 ML/MIN/1.73M2 — LOW
GLUCOSE BLDC GLUCOMTR-MCNC: 128 MG/DL — HIGH (ref 70–99)
GLUCOSE BLDC GLUCOMTR-MCNC: 184 MG/DL — HIGH (ref 70–99)
GLUCOSE SERPL-MCNC: 128 MG/DL — HIGH (ref 70–99)
HCT VFR BLD CALC: 25.6 % — LOW (ref 39–50)
HCV AB S/CO SERPL IA: 0.04 S/CO — SIGNIFICANT CHANGE UP
HCV AB SERPL-IMP: SIGNIFICANT CHANGE UP
HGB BLD-MCNC: 8.2 G/DL — LOW (ref 13–17)
MAGNESIUM SERPL-MCNC: 1.6 MG/DL — SIGNIFICANT CHANGE UP (ref 1.6–2.6)
MCHC RBC-ENTMCNC: 25.6 PG — LOW (ref 27–34)
MCHC RBC-ENTMCNC: 32 GM/DL — SIGNIFICANT CHANGE UP (ref 32–36)
MCV RBC AUTO: 80 FL — SIGNIFICANT CHANGE UP (ref 80–100)
NRBC # BLD: 0 /100 WBCS — SIGNIFICANT CHANGE UP (ref 0–0)
PLATELET # BLD AUTO: 246 K/UL — SIGNIFICANT CHANGE UP (ref 150–400)
POTASSIUM SERPL-MCNC: 3.8 MMOL/L — SIGNIFICANT CHANGE UP (ref 3.5–5.3)
POTASSIUM SERPL-SCNC: 3.8 MMOL/L — SIGNIFICANT CHANGE UP (ref 3.5–5.3)
RBC # BLD: 3.2 M/UL — LOW (ref 4.2–5.8)
RBC # FLD: 16 % — HIGH (ref 10.3–14.5)
SODIUM SERPL-SCNC: 136 MMOL/L — SIGNIFICANT CHANGE UP (ref 135–145)
WBC # BLD: 6.6 K/UL — SIGNIFICANT CHANGE UP (ref 3.8–10.5)
WBC # FLD AUTO: 6.6 K/UL — SIGNIFICANT CHANGE UP (ref 3.8–10.5)

## 2022-05-24 PROCEDURE — 82553 CREATINE MB FRACTION: CPT

## 2022-05-24 PROCEDURE — C1894: CPT

## 2022-05-24 PROCEDURE — 82962 GLUCOSE BLOOD TEST: CPT

## 2022-05-24 PROCEDURE — C1887: CPT

## 2022-05-24 PROCEDURE — C1876: CPT

## 2022-05-24 PROCEDURE — 80061 LIPID PANEL: CPT

## 2022-05-24 PROCEDURE — 83735 ASSAY OF MAGNESIUM: CPT

## 2022-05-24 PROCEDURE — 80053 COMPREHEN METABOLIC PANEL: CPT

## 2022-05-24 PROCEDURE — 80048 BASIC METABOLIC PNL TOTAL CA: CPT

## 2022-05-24 PROCEDURE — 85610 PROTHROMBIN TIME: CPT

## 2022-05-24 PROCEDURE — 36415 COLL VENOUS BLD VENIPUNCTURE: CPT

## 2022-05-24 PROCEDURE — 86803 HEPATITIS C AB TEST: CPT

## 2022-05-24 PROCEDURE — 82550 ASSAY OF CK (CPK): CPT

## 2022-05-24 PROCEDURE — 85025 COMPLETE CBC W/AUTO DIFF WBC: CPT

## 2022-05-24 PROCEDURE — 99239 HOSP IP/OBS DSCHRG MGMT >30: CPT

## 2022-05-24 PROCEDURE — 85027 COMPLETE CBC AUTOMATED: CPT

## 2022-05-24 PROCEDURE — C1725: CPT

## 2022-05-24 PROCEDURE — 85730 THROMBOPLASTIN TIME PARTIAL: CPT

## 2022-05-24 PROCEDURE — C1769: CPT

## 2022-05-24 PROCEDURE — 83036 HEMOGLOBIN GLYCOSYLATED A1C: CPT

## 2022-05-24 RX ORDER — RIVAROXABAN 15 MG-20MG
1 KIT ORAL
Qty: 0 | Refills: 0 | DISCHARGE

## 2022-05-24 RX ORDER — CLOPIDOGREL BISULFATE 75 MG/1
1 TABLET, FILM COATED ORAL
Qty: 0 | Refills: 0 | DISCHARGE

## 2022-05-24 RX ORDER — POTASSIUM CHLORIDE 20 MEQ
30 PACKET (EA) ORAL ONCE
Refills: 0 | Status: COMPLETED | OUTPATIENT
Start: 2022-05-24 | End: 2022-05-24

## 2022-05-24 RX ORDER — MAGNESIUM SULFATE 500 MG/ML
2 VIAL (ML) INJECTION ONCE
Refills: 0 | Status: COMPLETED | OUTPATIENT
Start: 2022-05-24 | End: 2022-05-24

## 2022-05-24 RX ORDER — CLOPIDOGREL BISULFATE 75 MG/1
1 TABLET, FILM COATED ORAL
Qty: 30 | Refills: 11
Start: 2022-05-24 | End: 2023-05-18

## 2022-05-24 RX ORDER — RIVAROXABAN 15 MG-20MG
1 KIT ORAL
Qty: 60 | Refills: 3
Start: 2022-05-24 | End: 2022-09-20

## 2022-05-24 RX ADMIN — RIVAROXABAN 2.5 MILLIGRAM(S): KIT at 05:40

## 2022-05-24 RX ADMIN — Medication 1: at 12:47

## 2022-05-24 RX ADMIN — CLOPIDOGREL BISULFATE 75 MILLIGRAM(S): 75 TABLET, FILM COATED ORAL at 12:47

## 2022-05-24 RX ADMIN — Medication 50 MILLIGRAM(S): at 05:39

## 2022-05-24 RX ADMIN — Medication 25 GRAM(S): at 07:24

## 2022-05-24 RX ADMIN — CARVEDILOL PHOSPHATE 25 MILLIGRAM(S): 80 CAPSULE, EXTENDED RELEASE ORAL at 05:40

## 2022-05-24 RX ADMIN — Medication 30 MILLIEQUIVALENT(S): at 07:23

## 2022-05-24 RX ADMIN — Medication 1 TABLET(S): at 12:47

## 2022-05-24 RX ADMIN — Medication 200 MILLIGRAM(S): at 12:47

## 2022-05-24 RX ADMIN — AMLODIPINE BESYLATE 10 MILLIGRAM(S): 2.5 TABLET ORAL at 05:40

## 2022-05-24 NOTE — DISCHARGE NOTE PROVIDER - NSDCCPCAREPLAN_GEN_ALL_CORE_FT
PRINCIPAL DISCHARGE DIAGNOSIS  Diagnosis: PAD (peripheral artery disease)  Assessment and Plan of Treatment: You have a history of peripheral artery disease and presented to the hospital for a peripheral angiogram. You received a stent to your left common iliac artery and were also found to have a residual blockage in your right common iliac artery. At this time, we would like you to continue Xarelto 2.5 mg BID and Plavix 75 mg daily. PLEASE DO NOT STOP THESE MEDICATIONS UNLESS INSTRUCTED TO BY YOUR CARDIOLOGIST. THESE MEDICATIONS ENSURE THAT THE STENTS PLACED REMAIN OPEN, AND STOPPING THESE MEDICATIONS CAN BE EXTREMELY HARMFUL TO YOUR HEALTH. Any prescripitions that you required on dischare have been sent to your preferred pharmacy. Please also continue to follow up with your outpatient cardiologist within 1-2 weeks of discharge for further management. You will be arranged to return for staged intervention of the residual blockages in your legs in the next 4-6 weeks.   The catheter from your groin was removed and bleeding was stopped with a closure device.  After 24hours you may take off the dressing and shower. Wash the site with soap and water.  There is no need to put on another bandage.  Avoid tub baths, hot tubs or swimming for 5 days.      Call the Interventional Cardiology and Vascular Team at [phone] or [phone] if any of following occur pertaining to your vascular access site: Bleeding or hematoma formation (collection of blood under the skin), drainage or redness at the puncture site, numbness, decrease in strength, coolness or pale coloration of skin of the leg or hand.       PRINCIPAL DISCHARGE DIAGNOSIS  Diagnosis: PAD (peripheral artery disease)  Assessment and Plan of Treatment: You have a history of peripheral artery disease and presented to the hospital for a peripheral angiogram. You received a stent to your left common iliac artery and were also found to have a residual blockage in your right common iliac artery. At this time, we would like you to continue Xarelto 2.5 mg BID and Plavix 75 mg daily. PLEASE DO NOT STOP THESE MEDICATIONS UNLESS INSTRUCTED TO BY YOUR CARDIOLOGIST. THESE MEDICATIONS ENSURE THAT THE STENTS PLACED REMAIN OPEN, AND STOPPING THESE MEDICATIONS CAN BE EXTREMELY HARMFUL TO YOUR HEALTH. Any prescripitions that you required on dischare have been sent to your preferred pharmacy. Please also continue to follow up with your outpatient cardiologist within 1-2 weeks of discharge for further management. You will be arranged to return for staged intervention of the residual blockages in your legs in the next 4-6 weeks.   The catheter from your groin was removed and bleeding was stopped with a closure device.  After 24hours you may take off the dressing and shower. Wash the site with soap and water.  There is no need to put on another bandage.  Avoid tub baths, hot tubs or swimming for 5 days.      Call the Interventional Cardiology and Vascular Team at [phone] or [phone] if any of following occur pertaining to your vascular access site: Bleeding or hematoma formation (collection of blood under the skin), drainage or redness at the puncture site, numbness, decrease in strength, coolness or pale coloration of skin of the leg or hand.      SECONDARY DISCHARGE DIAGNOSES  Diagnosis: Hypertension  Assessment and Plan of Treatment: You have a history of being diagnosed with high blood pressure and should continue to take your home blood pressure medications as they were previously prescribed to you. Please also continue to follow up with your outpatient providers for further management.    Diagnosis: Hyperlipidemia  Assessment and Plan of Treatment: You have a history of being diagnosed with high cholesterol and should continue your home medications as they have been previously prescribed.    Diagnosis: Type 2 diabetes mellitus  Assessment and Plan of Treatment: You have a history of being diagnosed with type 2 diabetes mellitus. At this time, you may continue to take all of your home medications as they have been previously prescribed to you.

## 2022-05-24 NOTE — DISCHARGE NOTE PROVIDER - CARE PROVIDER_API CALL
DAVIAN BERRIOS.  Internal Medicine  [address]  [place] [address]  Phone: [phone]  Fax: ()-  Follow Up Time: 2 weeks

## 2022-05-24 NOTE — DISCHARGE NOTE NURSING/CASE MANAGEMENT/SOCIAL WORK - PATIENT PORTAL LINK FT
You can access the FollowMyHealth Patient Portal offered by [facility] by registering at the following website: [email or web address]. By joining [facility]’s FollowMyHealth portal, you will also be able to view your health information using other applications (apps) compatible with our system.

## 2022-05-24 NOTE — DISCHARGE NOTE NURSING/CASE MANAGEMENT/SOCIAL WORK - NSDCPEFALRISK_GEN_ALL_CORE
For information on Fall & Injury Prevention, visit: [email or web address] OR  [email or web address] OR  https://www.cdc.gov/steadi/patient.html

## 2022-05-24 NOTE — DISCHARGE NOTE PROVIDER - NSDCMRMEDTOKEN_GEN_ALL_CORE_FT
allopurinol 100 mg oral tablet: 2 tab(s) orally once a day  amLODIPine 10 mg oral tablet: 1 tab(s) orally once a day  atorvastatin 40 mg oral tablet: 1 tab(s) orally once a day  clopidogrel 75 mg oral tablet: 1 tab(s) orally once a day  Coreg 25 mg oral tablet: 1 tab(s) orally 2 times a day  Farxiga 10 mg oral tablet: 1 tab(s) orally once a day  glipiZIDE 10 mg oral tablet: 1 tab(s) orally 2 times a day  hydrALAZINE 50 mg oral tablet: 1 tab(s) orally 3 times a day  Januvia 25 mg oral tablet: 1 tab(s) orally once a day  nateglinide 60 mg oral tablet: 1 tab(s) orally 3 times a day (before meals)  Nephplex Rx oral tablet: 1 tab(s) orally once a day  pioglitazone 30 mg oral tablet: 1 tab(s) orally once a day  Vascepa 1 g oral capsule: 2 cap(s) orally 2 times a day  Vitamin D3 1250 mcg (50,000 intl units) oral capsule: 1 cap(s) orally once a week  Xarelto 2.5 mg oral tablet: 1 tab(s) orally 2 times a day

## 2022-05-24 NOTE — DISCHARGE NOTE PROVIDER - HOSPITAL COURSE
df Pt ambi pt INCOMPLETE     65 y/o male, Cantonese speaking, POOR HISTORIAN, w/ PMHx HTN, HLD, type 2 DM, CAD (s/p CABG x 2, LIMA-LAD and SVG-RPDA @ [place] in 08/2017), stage IV CKD (baseline Cr 2.8 from prior outpatient labs), bilateral carotid artery stenosis (known bilateral ICA chronic total occlusion), PAD (s/p multiple prior peripheral caths w/ interventions, on Xarleto 2.5 mg BID), ischemic cardiomyopathy (EF 46% by Echo in 02/2022), prior CVA (w/ residual left sided weakness), and ? HIV/Hepatitis (previously on Viread, patient unable to confirm or deny, and has not refilled medication recently) who presented to outpatient cardiologist, Dr. Mario, endorsing bilateral gradual worsening leg pain/heaviness/weakness of his lower extremities precipitate by ambulation and promptly resolving w/ rest. Patient reported he could only walk less than 1 block or less than 1 flight of stairs. MAGALI (01/14/2022) revealed right sided resting MAGALI was mild, post-exercise MAGALI was severe, and TBI was normal; left sided resting MAGALI was moderate, post-exercise MAGALI was severe, and TBI was mild/moderate. LE Arterial Duplex (01/25/2022) evidence of calcified plaque in the proximal RSFA and mid RSFA, moderate stenosis in the bilateral common femoral artery, distal superficial femoral artery, and politeal artery, severe stenosis in mid RSFA, evidence of occluded stenosis in right PTA and anterior tibial artery, evidence of moderate stenosis in left deep femoral artery, superficial femoral artery. and distal posterior tibial artery, evidence of severe stenosis in left middle posterior tibial artery, and evidence of occluded stenosis in the left anterior tibial artery. Echocardiogram (02/13/2022) showed moderately dilated LA, moderate concentric LV hypertrophy, EF 46%, grade I diastolic dysfunction, and aortic valve stenosis w/ trace AR. In light of patient's risk factors, [place] Class 2-3 symptoms, and abnormal MAGALI/Arterial Duplex results, patient presented for peripheral angiogram w/ possible intervention if clinically indicated. Patient is now s/p peripheral angiogram w/ shockwave/self-expanding stent to the left ROSENDA and residual findings of right ROSENDA 80%. Left femoral access was used, and 7French sheath was eventually pulled appropriately and w/o complications. Patient will be planned to return for staged intervention of residual right ROSENDA disease in 4-6 weeks.     Patient was seen and examined at bedside on 05/24/2022 AM and _______. Left groin access site remained stable. Labs were reviewed. EKG was w/o acute ischemic changes and no significant events were noted overnight on telemetry. Patient has now been medically cleared for discharge as per Dr. Tristan. Patient was given appropriate discharge instructions including medication regimen, access site management, and follow up. Any prescriptions the patient requires refills on upon discharge have been e-prescribed to patient's preferred pharmacy.     VS Stable  Gen: NAD, A&O x3  Cards: RRR, clear S1 and S2 without murmur  Pulm: CTA B/L without w/r/r  Left Groin: No hematoma or ooze, peripheral pulses 2+ B/L  Abd: soft, NT  Ext: no LE edema or ulcerations B/L 65 y/o male, Cantonese speaking, POOR HISTORIAN, w/ PMHx HTN, HLD, type 2 DM, CAD (s/p CABG x 2, LIMA-LAD and SVG-RPDA @ [place] in 08/2017), stage IV CKD (baseline Cr 2.8 from prior outpatient labs), bilateral carotid artery stenosis (known bilateral ICA chronic total occlusion), PAD (s/p multiple prior peripheral caths w/ interventions, on Xarleto 2.5 mg BID), ischemic cardiomyopathy (EF 46% by Echo in 02/2022), prior CVA (w/ residual left sided weakness), and ? HIV/Hepatitis (previously on Viread, patient unable to confirm or deny, and has not refilled medication recently) who presented to outpatient cardiologist, Dr. Mario, endorsing bilateral gradual worsening leg pain/heaviness/weakness of his lower extremities precipitate by ambulation and promptly resolving w/ rest. Patient reported he could only walk less than 1 block or less than 1 flight of stairs. MAGALI (01/14/2022) revealed right sided resting MAGALI was mild, post-exercise MAGALI was severe, and TBI was normal; left sided resting MAGALI was moderate, post-exercise MAGALI was severe, and TBI was mild/moderate. LE Arterial Duplex (01/25/2022) evidence of calcified plaque in the proximal RSFA and mid RSFA, moderate stenosis in the bilateral common femoral artery, distal superficial femoral artery, and politeal artery, severe stenosis in mid RSFA, evidence of occluded stenosis in right PTA and anterior tibial artery, evidence of moderate stenosis in left deep femoral artery, superficial femoral artery. and distal posterior tibial artery, evidence of severe stenosis in left middle posterior tibial artery, and evidence of occluded stenosis in the left anterior tibial artery. Echocardiogram (02/13/2022) showed moderately dilated LA, moderate concentric LV hypertrophy, EF 46%, grade I diastolic dysfunction, and aortic valve stenosis w/ trace AR. In light of patient's risk factors, [place] Class 2-3 symptoms, and abnormal MAGALI/Arterial Duplex results, patient presented for peripheral angiogram w/ possible intervention if clinically indicated. Patient is now s/p peripheral angiogram w/ shockwave/self-expanding stent to the left ROSENDA and residual findings of right ROSENDA 80%. Left femoral access was used, and 7French sheath was eventually pulled appropriately and w/o complications. Patient will be planned to return for staged intervention of residual right ROSENDA disease in 4-6 weeks.     Patient was seen and examined at bedside on 05/24/2022 AM and denied any complaints, resting comfortably in bed and in no acute distress on exam. Left groin access site remained stable. Labs were reviewed. EKG was w/o acute ischemic changes and no significant events were noted overnight on telemetry. Patient has now been medically cleared for discharge as per Dr. Tristan. Patient was given appropriate discharge instructions including medication regimen, access site management, and follow up. Any prescriptions the patient requires refills on upon discharge have been e-prescribed to patient's preferred pharmacy.     VS Stable  Gen: NAD, A&O x3  Cards: RRR, clear S1 and S2 without murmur  Pulm: CTA B/L without w/r/r  Left Groin: No hematoma or ooze, peripheral pulses 2+ B/L  Abd: soft, NT  Ext: no LE edema or ulcerations B/L 65 y/o male, Cantonese speaking, POOR HISTORIAN, w/ PMHx HTN, HLD, type 2 DM, CAD (s/p CABG x 2, LIMA-LAD and SVG-RPDA @ [place] in 08/2017), stage IV CKD (baseline Cr 2.8 from prior outpatient labs), bilateral carotid artery stenosis (known bilateral ICA chronic total occlusion), PAD (s/p multiple prior peripheral caths w/ interventions, on Xarleto 2.5 mg BID), ischemic cardiomyopathy (EF 46% by Echo in 02/2022), prior CVA (w/ residual left sided weakness), and ? HIV/Hepatitis (previously on Viread, patient unable to confirm or deny, and has not refilled medication recently) who presented to outpatient cardiologist, Dr. Mario, endorsing bilateral gradual worsening leg pain/heaviness/weakness of his lower extremities precipitate by ambulation and promptly resolving w/ rest. Patient reported he could only walk less than 1 block or less than 1 flight of stairs. MAGALI (01/14/2022) revealed right sided resting MAGALI was mild, post-exercise MAGALI was severe, and TBI was normal; left sided resting MAGALI was moderate, post-exercise MAGALI was severe, and TBI was mild/moderate. LE Arterial Duplex (01/25/2022) evidence of calcified plaque in the proximal RSFA and mid RSFA, moderate stenosis in the bilateral common femoral artery, distal superficial femoral artery, and politeal artery, severe stenosis in mid RSFA, evidence of occluded stenosis in right PTA and anterior tibial artery, evidence of moderate stenosis in left deep femoral artery, superficial femoral artery. and distal posterior tibial artery, evidence of severe stenosis in left middle posterior tibial artery, and evidence of occluded stenosis in the left anterior tibial artery. Echocardiogram (02/13/2022) showed moderately dilated LA, moderate concentric LV hypertrophy, EF 46%, grade I diastolic dysfunction, and aortic valve stenosis w/ trace AR. In light of patient's risk factors, [place] Class 2-3 symptoms, and abnormal MAGALI/Arterial Duplex results, patient presented for peripheral angiogram w/ possible intervention if clinically indicated. Patient is now s/p peripheral angiogram w/ shockwave/self-expanding stent to the left ROSENDA and residual findings of right ROSENDA 80%. Left femoral access was used, and 7French sheath was eventually pulled appropriately and w/o complications. Patient will be planned to return for staged intervention of residual right ROSENDA disease in 4-6 weeks.     Patient was seen and examined at bedside on 05/24/2022 AM and denied any complaints, resting comfortably in bed and in no acute distress on exam. Left groin access site remained stable. Labs were reviewed. EKG was w/o acute ischemic changes and no significant events were noted overnight on telemetry. Patient has now been medically cleared for discharge as per Dr. Tristan. Patient was given appropriate discharge instructions including medication regimen, access site management, and follow up. Any prescriptions the patient requires refills on upon discharge have been e-prescribed to patient's preferred pharmacy.     VS Stable  Gen: NAD, A&O x3  Cards: RRR, clear S1 and S2 without murmur  Pulm: CTA B/L without w/r/r  Left Groin: No hematoma or ooze, peripheral pulses 2+ B/L  Abd: soft, NT  Ext: no LE edema or ulcerations B/L    Reasons for No Cardiac Rehab Referral Rx: Patient will be referred by outpatient cardiology group ([facility])    Statin Prescribed (Peripheral intervention this admission): Yes  DAPT: Prescriptions for Xarelto and Plavix e-prescribed to patient's preferred pharmacy.

## 2022-06-01 DIAGNOSIS — Z79.84 LONG TERM (CURRENT) USE OF ORAL HYPOGLYCEMIC DRUGS: ICD-10-CM

## 2022-06-01 DIAGNOSIS — I10 ESSENTIAL (PRIMARY) HYPERTENSION: ICD-10-CM

## 2022-06-01 DIAGNOSIS — I70.293 OTHER ATHEROSCLEROSIS OF NATIVE ARTERIES OF EXTREMITIES, BILATERAL LEGS: ICD-10-CM

## 2022-06-01 DIAGNOSIS — I12.9 HYPERTENSIVE CHRONIC KIDNEY DISEASE WITH STAGE 1 THROUGH STAGE 4 CHRONIC KIDNEY DISEASE, OR UNSPECIFIED CHRONIC KIDNEY DISEASE: ICD-10-CM

## 2022-06-01 DIAGNOSIS — Z95.828 PRESENCE OF OTHER VASCULAR IMPLANTS AND GRAFTS: ICD-10-CM

## 2022-06-01 DIAGNOSIS — I25.5 ISCHEMIC CARDIOMYOPATHY: ICD-10-CM

## 2022-06-01 DIAGNOSIS — E11.22 TYPE 2 DIABETES MELLITUS WITH DIABETIC CHRONIC KIDNEY DISEASE: ICD-10-CM

## 2022-06-01 DIAGNOSIS — D63.1 ANEMIA IN CHRONIC KIDNEY DISEASE: ICD-10-CM

## 2022-06-01 DIAGNOSIS — E78.5 HYPERLIPIDEMIA, UNSPECIFIED: ICD-10-CM

## 2022-06-01 DIAGNOSIS — E11.51 TYPE 2 DIABETES MELLITUS WITH DIABETIC PERIPHERAL ANGIOPATHY WITHOUT GANGRENE: ICD-10-CM

## 2022-06-01 DIAGNOSIS — Z79.01 LONG TERM (CURRENT) USE OF ANTICOAGULANTS: ICD-10-CM

## 2022-06-01 DIAGNOSIS — I25.10 ATHEROSCLEROTIC HEART DISEASE OF NATIVE CORONARY ARTERY WITHOUT ANGINA PECTORIS: ICD-10-CM

## 2022-06-01 DIAGNOSIS — Z95.1 PRESENCE OF AORTOCORONARY BYPASS GRAFT: ICD-10-CM

## 2022-06-01 DIAGNOSIS — Z79.02 LONG TERM (CURRENT) USE OF ANTITHROMBOTICS/ANTIPLATELETS: ICD-10-CM

## 2022-06-01 DIAGNOSIS — I69.354 HEMIPLEGIA AND HEMIPARESIS FOLLOWING CEREBRAL INFARCTION AFFECTING LEFT NON-DOMINANT SIDE: ICD-10-CM

## 2022-06-01 DIAGNOSIS — N18.4 CHRONIC KIDNEY DISEASE, STAGE 4 (SEVERE): ICD-10-CM

## 2024-05-01 VITALS
WEIGHT: 182.1 LBS | DIASTOLIC BLOOD PRESSURE: 69 MMHG | HEIGHT: 66.14 IN | OXYGEN SATURATION: 97 % | TEMPERATURE: 98 F | SYSTOLIC BLOOD PRESSURE: 147 MMHG | HEART RATE: 70 BPM | RESPIRATION RATE: 17 BRPM

## 2024-05-01 RX ORDER — ATORVASTATIN CALCIUM 80 MG/1
1 TABLET, FILM COATED ORAL
Qty: 0 | Refills: 0 | DISCHARGE

## 2024-05-01 RX ORDER — SITAGLIPTIN 50 MG/1
1 TABLET, FILM COATED ORAL
Qty: 0 | Refills: 0 | DISCHARGE

## 2024-05-01 RX ORDER — AMLODIPINE BESYLATE 2.5 MG/1
1 TABLET ORAL
Qty: 0 | Refills: 0 | DISCHARGE

## 2024-05-01 RX ORDER — HYDRALAZINE HCL 50 MG
1 TABLET ORAL
Qty: 0 | Refills: 0 | DISCHARGE

## 2024-05-01 RX ORDER — ALLOPURINOL 300 MG
2 TABLET ORAL
Qty: 0 | Refills: 0 | DISCHARGE

## 2024-05-01 RX ORDER — NATEGLINIDE 60 MG/1
1 TABLET, COATED ORAL
Qty: 0 | Refills: 0 | DISCHARGE

## 2024-05-01 NOTE — H&P ADULT - NSHPLABSRESULTS_GEN_ALL_CORE
11.4   7.26  )-----------( 258      ( 14 Jun 2024 10:38 )             36.0       06-14    138  |  104  |  38<H>  ----------------------------<  151<H>  4.5   |  25  |  3.39<H>    Ca    9.4      14 Jun 2024 10:38  Mg     2.1     06-14    TPro  8.1  /  Alb  4.1  /  TBili  0.5  /  DBili  x   /  AST  25  /  ALT  18  /  AlkPhos  77  06-14      PT/INR - ( 14 Jun 2024 10:38 )   PT: 10.2 sec;   INR: 0.89          PTT - ( 14 Jun 2024 10:38 )  PTT:27.9 sec          Urinalysis Basic - ( 14 Jun 2024 10:38 )    Color: x / Appearance: x / SG: x / pH: x  Gluc: 151 mg/dL / Ketone: x  / Bili: x / Urobili: x   Blood: x / Protein: x / Nitrite: x   Leuk Esterase: x / RBC: x / WBC x   Sq Epi: x / Non Sq Epi: x / Bacteria: x        EKG: NSR, 1st degree AV block, T wave abnormality, consider lateral ischemia

## 2024-05-01 NOTE — H&P ADULT - HISTORY OF PRESENT ILLNESS
Cardiologist: Dr. Mario  Pharmacy:   Escort:      65 y/o male, Cantonese speaking, POOR HISTORIAN, w/ PMHx HTN, HLD, type 2 DM, CAD (s/p CABG x 2, LIMA-LAD and SVG-RPDA @ [place] in 08/2017 and PCI or PTCA 07/2016 of LM and LAD w/ residual  RCA), hx NSTEMI stage IV CKD (baseline Cr 2.8 from prior outpatient labs), bilateral carotid artery stenosis (known bilateral ICA chronic total occlusion), PAD (s/p multiple prior peripheral caths w/ interventions most recently 5/2022; on Xarleto 2.5 mg BID), ischemic cardiomyopathy (EF 40% by TTE 03/2024), prior CVA (w/ residual left sided weakness, MRI 5/2023 chorinic R frontal loba, R corona radiata and L caudate infarcts), TIA(multiple episodes), CVD (occluded R MCA and suprclinoid L ICA mod R PCA),b/l carotid artery stenosis, renal artery stenosis, and ? HIV/Hepatitis (previously on Viread, patient unable to confirm or deny, and has not refilled medication recently) who presented to outpatient cardiologist, Dr. Mario, with c/o chest pain. Pt describes the pain as located in his substernal area, without radiation, pressure-like, gradually worsening, intermittent for the past few weeks, exertional, relieved with rest and precipitated by moderate physical exertion. In light of pts risk factors, CCS class III anginal symptoms and abnormal cath, pt now presents to [facility] for recommended cardiac catheterization with possible intervention if clinically indicated.     Cardiac Cath (6/2017): sucessful staged PCI LM and LAD awaiting staged PCI RCA   NST (3/30/24): moderate amount of infarction in inferolateral location and small amount of ischemia in the anteroapical, LVSF abnormal w/o RWMA.  TTE (03/01/24): EF 40%, trace AI/MR/TR, severely dilated LA, mild concentric LVH, global left ventricular hypokinesis.  TTE (02/13/2022): showed moderately dilated LA, moderate concentric LV hypertrophy, EF 46%, grade I diastolic dysfunction, and aortic valve stenosis w/ trace AR.   MAGALI (01/14/2022) revealed right sided resting MAGALI was mild, post-exercise MAGALI was severe, and TBI was normal; left sided resting MAGALI was moderate, post-exercise MAGALI was severe, and TBI was mild/moderate.  LE Arterial Duplex (01/25/2022): evidence of calcified plaque in the proximal RSFA and mid RSFA, moderate stenosis in the bilateral common femoral artery, distal superficial femoral artery, and politeal artery, severe stenosis in mid RSFA, evidence of occluded stenosis in right PTA and anterior tibial artery, evidence of moderate stenosis in left deep femoral artery, superficial femoral artery. and distal posterior tibial artery, evidence of severe stenosis in left middle posterior tibial artery, and evidence of occluded stenosis in the left anterior tibial artery.   Peripheral Angiogram (05/23/22): shockwave/self-expanding stent to the left ROSENDA and residual findings of right ROSENDA 80%. Left femoral access was used, and 7French sheath. To return for staged intervention of residual right ROSENDA disease in 4-6 weeks.    Cardiologist: Dr. Mario  Pharmacy:   Escort:      67 y/o male, Cantonese speaking, POOR HISTORIAN, w/ PMHx HTN, HLD, type 2 DM, CAD (s/p CABG x 2, LIMA-LAD and SVG-RPDA @ [place] in 08/2017 and PCI or PTCA 07/2016 of LM and LAD w/ residual  RCA), hx NSTEMI stage IV CKD (baseline Cr 2.8 from prior outpatient labs), bilateral carotid artery stenosis (known bilateral ICA chronic total occlusion), PAD (s/p multiple prior peripheral caths w/ interventions most recently 5/2022; on Xarleto 2.5 mg BID), ischemic cardiomyopathy (EF 40% by TTE 03/2024), prior CVA (w/ residual left sided weakness, MRI 5/2023 chorinic R frontal loba, R corona radiata and L caudate infarcts), TIA (multiple episodes), CVD (occluded R MCA and suprclinoid L ICA mod R PCA), renal artery stenosis, and ? HIV/Hepatitis (previously on Viread, patient unable to confirm or deny, and has not refilled medication recently) who presented to outpatient cardiologist, Dr. Mario, with c/o chest pain. Pt describes the pain as located in his substernal area, without radiation, pressure-like, gradually worsening, intermittent for the past few weeks, exertional, relieved with rest and precipitated by moderate physical exertion. In light of pts risk factors, CCS class III anginal symptoms and abnormal cath, pt now presents to [facility] for recommended cardiac catheterization with possible intervention if clinically indicated.     Cardiac Cath (6/2017): sucessful staged PCI LM and LAD awaiting staged PCI RCA   NST (3/30/24): moderate amount of infarction in inferolateral location and small amount of ischemia in the anteroapical, LVSF abnormal w/o RWMA.  TTE (03/01/24): EF 40%, trace AI/MR/TR, severely dilated LA, mild concentric LVH, global left ventricular hypokinesis.  TTE (02/13/2022): showed moderately dilated LA, moderate concentric LV hypertrophy, EF 46%, grade I diastolic dysfunction, and aortic valve stenosis w/ trace AR.   MAGALI (01/14/2022) revealed right sided resting MAGALI was mild, post-exercise MAGALI was severe, and TBI was normal; left sided resting MAGALI was moderate, post-exercise MAGALI was severe, and TBI was mild/moderate.  LE Arterial Duplex (01/25/2022): evidence of calcified plaque in the proximal RSFA and mid RSFA, moderate stenosis in the bilateral common femoral artery, distal superficial femoral artery, and politeal artery, severe stenosis in mid RSFA, evidence of occluded stenosis in right PTA and anterior tibial artery, evidence of moderate stenosis in left deep femoral artery, superficial femoral artery. and distal posterior tibial artery, evidence of severe stenosis in left middle posterior tibial artery, and evidence of occluded stenosis in the left anterior tibial artery.   Peripheral Angiogram (05/23/22): shockwave/self-expanding stent to the left ROSENDA and residual findings of right ROSENDA 80%. Left femoral access was used, and 7French sheath. To return for staged intervention of residual right ROSENDA disease in 4-6 weeks.  Cardiologist: Dr. Mario  Pharmacy: [facility]  Escort: Wife/daughter      65 y/o male, Cantonese speaking, POOR HISTORIAN, w/ PMHx HTN, HLD, type 2 DM, CAD (s/p CABG x 2, LIMA-LAD and SVG-RPDA @ [place] in 08/2017 and PCI or PTCA 07/2016 of LM and LAD w/ residual  RCA), hx NSTEMI stage IV CKD (baseline Cr 2.8 from prior outpatient labs), bilateral carotid artery stenosis (known bilateral ICA chronic total occlusion), PAD (s/p multiple prior peripheral caths w/ interventions most recently 5/2022; on Xarleto 2.5 mg BID previously), ischemic cardiomyopathy (EF 40% by TTE 03/2024), prior CVA (w/ residual left sided weakness/numbness/blurry vision/slow speach, MRI 5/2023 chorinic R frontal loba, R corona radiata and L caudate infarcts), TIA (multiple episodes), CVD (occluded R MCA and suprclinoid L ICA mod R PCA), renal artery stenosis, and ? HIV/Hepatitis (previously on Viread, patient unable to confirm or deny, and has not refilled medication recently) who presented to outpatient cardiologist, Dr. Mario, with c/o chest pain. Pt describes the pain as located in his substernal area, without radiation, pressure-like, gradually worsening, intermittent for the past few weeks, exertional, relieved with rest and precipitated by moderate physical exertion. In light of pts risk factors, CCS class III anginal symptoms and abnormal cath, pt now presents to [facility] for recommended cardiac catheterization with possible intervention if clinically indicated.     Cardiac Cath (6/2017): sucessful staged PCI LM and LAD awaiting staged PCI RCA   NST (3/30/24): moderate amount of infarction in inferolateral location and small amount of ischemia in the anteroapical, LVSF abnormal w/o RWMA.  TTE (03/01/24): EF 40%, trace AI/MR/TR, severely dilated LA, mild concentric LVH, global left ventricular hypokinesis.  TTE (02/13/2022): showed moderately dilated LA, moderate concentric LV hypertrophy, EF 46%, grade I diastolic dysfunction, and aortic valve stenosis w/ trace AR.   MAGALI (01/14/2022) revealed right sided resting MAGALI was mild, post-exercise MAGALI was severe, and TBI was normal; left sided resting MAGALI was moderate, post-exercise MAGALI was severe, and TBI was mild/moderate.  LE Arterial Duplex (01/25/2022): evidence of calcified plaque in the proximal RSFA and mid RSFA, moderate stenosis in the bilateral common femoral artery, distal superficial femoral artery, and politeal artery, severe stenosis in mid RSFA, evidence of occluded stenosis in right PTA and anterior tibial artery, evidence of moderate stenosis in left deep femoral artery, superficial femoral artery. and distal posterior tibial artery, evidence of severe stenosis in left middle posterior tibial artery, and evidence of occluded stenosis in the left anterior tibial artery.   Peripheral Angiogram (05/23/22): shockwave/self-expanding stent to the left ROSENDA and residual findings of right ROSENDA 80%. Left femoral access was used, and 7French sheath. To return for staged intervention of residual right ROSENDA disease in 4-6 weeks.

## 2024-05-01 NOTE — H&P ADULT - MUSCULOSKELETAL
left sided weakness/numbness/normal/ROM intact/normal gait/strength 5/5 bilateral upper extremities/strength 5/5 bilateral lower extremities

## 2024-05-01 NOTE — H&P ADULT - NSICDXPASTMEDICALHX_GEN_ALL_CORE_FT
PAST MEDICAL HISTORY:  CAD (coronary artery disease)     CVA (cerebrovascular accident)     CVD (cerebrovascular disease)     PAD (peripheral artery disease)     S/P CABG x 2

## 2024-06-14 ENCOUNTER — OUTPATIENT (OUTPATIENT)
Dept: OUTPATIENT SERVICES | Facility: HOSPITAL | Age: 69
LOS: 1 days | Discharge: HOME | End: 2024-06-14
Payer: MEDICARE

## 2024-06-14 LAB
A1C WITH ESTIMATED AVERAGE GLUCOSE RESULT: 7.1 % — HIGH (ref 4–5.6)
ALBUMIN SERPL ELPH-MCNC: 4.1 G/DL — SIGNIFICANT CHANGE UP (ref 3.3–5)
ALP SERPL-CCNC: 77 U/L — SIGNIFICANT CHANGE UP (ref 40–120)
ALT FLD-CCNC: 18 U/L — SIGNIFICANT CHANGE UP (ref 10–45)
ANION GAP SERPL CALC-SCNC: 9 MMOL/L — SIGNIFICANT CHANGE UP (ref 5–17)
APTT BLD: 27.9 SEC — SIGNIFICANT CHANGE UP (ref 24.5–35.6)
AST SERPL-CCNC: 25 U/L — SIGNIFICANT CHANGE UP (ref 10–40)
BASOPHILS # BLD AUTO: 0.03 K/UL — SIGNIFICANT CHANGE UP (ref 0–0.2)
BASOPHILS NFR BLD AUTO: 0.4 % — SIGNIFICANT CHANGE UP (ref 0–2)
BILIRUB SERPL-MCNC: 0.5 MG/DL — SIGNIFICANT CHANGE UP (ref 0.2–1.2)
BUN SERPL-MCNC: 38 MG/DL — HIGH (ref 7–23)
CALCIUM SERPL-MCNC: 9.4 MG/DL — SIGNIFICANT CHANGE UP (ref 8.4–10.5)
CHLORIDE SERPL-SCNC: 104 MMOL/L — SIGNIFICANT CHANGE UP (ref 96–108)
CHOLEST SERPL-MCNC: 126 MG/DL — SIGNIFICANT CHANGE UP
CO2 SERPL-SCNC: 25 MMOL/L — SIGNIFICANT CHANGE UP (ref 22–31)
CREAT SERPL-MCNC: 3.39 MG/DL — HIGH (ref 0.5–1.3)
EGFR: 19 ML/MIN/1.73M2 — LOW
EOSINOPHIL # BLD AUTO: 0.24 K/UL — SIGNIFICANT CHANGE UP (ref 0–0.5)
EOSINOPHIL NFR BLD AUTO: 3.3 % — SIGNIFICANT CHANGE UP (ref 0–6)
ESTIMATED AVERAGE GLUCOSE: 157 MG/DL — HIGH (ref 68–114)
GLUCOSE BLDC GLUCOMTR-MCNC: 112 MG/DL — HIGH (ref 70–99)
GLUCOSE BLDC GLUCOMTR-MCNC: 140 MG/DL — HIGH (ref 70–99)
GLUCOSE SERPL-MCNC: 151 MG/DL — HIGH (ref 70–99)
HCT VFR BLD CALC: 36 % — LOW (ref 39–50)
HDLC SERPL-MCNC: 36 MG/DL — LOW
HGB BLD-MCNC: 11.4 G/DL — LOW (ref 13–17)
IMM GRANULOCYTES NFR BLD AUTO: 0.6 % — SIGNIFICANT CHANGE UP (ref 0–0.9)
INR BLD: 0.89 — SIGNIFICANT CHANGE UP (ref 0.85–1.18)
LIPID PNL WITH DIRECT LDL SERPL: 54 MG/DL — SIGNIFICANT CHANGE UP
LYMPHOCYTES # BLD AUTO: 2.14 K/UL — SIGNIFICANT CHANGE UP (ref 1–3.3)
LYMPHOCYTES # BLD AUTO: 29.5 % — SIGNIFICANT CHANGE UP (ref 13–44)
MAGNESIUM SERPL-MCNC: 2.1 MG/DL — SIGNIFICANT CHANGE UP (ref 1.6–2.6)
MCHC RBC-ENTMCNC: 25.2 PG — LOW (ref 27–34)
MCHC RBC-ENTMCNC: 31.7 GM/DL — LOW (ref 32–36)
MCV RBC AUTO: 79.6 FL — LOW (ref 80–100)
MONOCYTES # BLD AUTO: 0.62 K/UL — SIGNIFICANT CHANGE UP (ref 0–0.9)
MONOCYTES NFR BLD AUTO: 8.5 % — SIGNIFICANT CHANGE UP (ref 2–14)
NEUTROPHILS # BLD AUTO: 4.19 K/UL — SIGNIFICANT CHANGE UP (ref 1.8–7.4)
NEUTROPHILS NFR BLD AUTO: 57.7 % — SIGNIFICANT CHANGE UP (ref 43–77)
NON HDL CHOLESTEROL: 90 MG/DL — SIGNIFICANT CHANGE UP
NRBC # BLD: 0 /100 WBCS — SIGNIFICANT CHANGE UP (ref 0–0)
PLATELET # BLD AUTO: 258 K/UL — SIGNIFICANT CHANGE UP (ref 150–400)
POTASSIUM SERPL-MCNC: 4.5 MMOL/L — SIGNIFICANT CHANGE UP (ref 3.5–5.3)
POTASSIUM SERPL-SCNC: 4.5 MMOL/L — SIGNIFICANT CHANGE UP (ref 3.5–5.3)
PROT SERPL-MCNC: 8.1 G/DL — SIGNIFICANT CHANGE UP (ref 6–8.3)
PROTHROM AB SERPL-ACNC: 10.2 SEC — SIGNIFICANT CHANGE UP (ref 9.5–13)
RBC # BLD: 4.52 M/UL — SIGNIFICANT CHANGE UP (ref 4.2–5.8)
RBC # FLD: 15.8 % — HIGH (ref 10.3–14.5)
SODIUM SERPL-SCNC: 138 MMOL/L — SIGNIFICANT CHANGE UP (ref 135–145)
TRIGL SERPL-MCNC: 225 MG/DL — HIGH
WBC # BLD: 7.26 K/UL — SIGNIFICANT CHANGE UP (ref 3.8–10.5)
WBC # FLD AUTO: 7.26 K/UL — SIGNIFICANT CHANGE UP (ref 3.8–10.5)

## 2024-06-14 PROCEDURE — 83036 HEMOGLOBIN GLYCOSYLATED A1C: CPT

## 2024-06-14 PROCEDURE — 85730 THROMBOPLASTIN TIME PARTIAL: CPT

## 2024-06-14 PROCEDURE — 93010 ELECTROCARDIOGRAM REPORT: CPT | Mod: 1L

## 2024-06-14 PROCEDURE — 93459 L HRT ART/GRFT ANGIO: CPT

## 2024-06-14 PROCEDURE — 93459 L HRT ART/GRFT ANGIO: CPT | Mod: 26

## 2024-06-14 PROCEDURE — 83735 ASSAY OF MAGNESIUM: CPT

## 2024-06-14 PROCEDURE — 99152 MOD SED SAME PHYS/QHP 5/>YRS: CPT

## 2024-06-14 PROCEDURE — 93005 ELECTROCARDIOGRAM TRACING: CPT

## 2024-06-14 PROCEDURE — 85025 COMPLETE CBC W/AUTO DIFF WBC: CPT

## 2024-06-14 PROCEDURE — 36415 COLL VENOUS BLD VENIPUNCTURE: CPT

## 2024-06-14 PROCEDURE — 80061 LIPID PANEL: CPT

## 2024-06-14 PROCEDURE — C1887: CPT

## 2024-06-14 PROCEDURE — 80053 COMPREHEN METABOLIC PANEL: CPT

## 2024-06-14 PROCEDURE — 85610 PROTHROMBIN TIME: CPT

## 2024-06-14 PROCEDURE — C1894: CPT

## 2024-06-14 PROCEDURE — 82962 GLUCOSE BLOOD TEST: CPT

## 2024-06-14 PROCEDURE — C1769: CPT

## 2024-06-14 RX ORDER — TICAGRELOR 90 MG/1
180 TABLET ORAL ONCE
Refills: 0 | Status: COMPLETED | OUTPATIENT
Start: 2024-06-14 | End: 2024-06-14

## 2024-06-14 RX ORDER — NATEGLINIDE 60 MG/1
1 TABLET, COATED ORAL
Refills: 0 | DISCHARGE

## 2024-06-14 RX ORDER — VERICIGUAT 2.5 MG/1
1 TABLET, FILM COATED ORAL
Refills: 0 | DISCHARGE

## 2024-06-14 RX ORDER — SODIUM CHLORIDE 9 MG/ML
250 INJECTION INTRAMUSCULAR; INTRAVENOUS; SUBCUTANEOUS ONCE
Refills: 0 | Status: COMPLETED | OUTPATIENT
Start: 2024-06-14 | End: 2024-06-14

## 2024-06-14 RX ORDER — DAPAGLIFLOZIN 10 MG/1
1 TABLET, FILM COATED ORAL
Qty: 0 | Refills: 0 | DISCHARGE

## 2024-06-14 RX ORDER — CHLORHEXIDINE GLUCONATE 213 G/1000ML
1 SOLUTION TOPICAL ONCE
Refills: 0 | Status: ACTIVE | OUTPATIENT
Start: 2024-06-14

## 2024-06-14 RX ORDER — HYDRALAZINE HCL 50 MG
10 TABLET ORAL ONCE
Refills: 0 | Status: DISCONTINUED | OUTPATIENT
Start: 2024-06-14 | End: 2024-06-14

## 2024-06-14 RX ORDER — SACUBITRIL AND VALSARTAN 24; 26 MG/1; MG/1
1 TABLET, FILM COATED ORAL
Refills: 0 | DISCHARGE

## 2024-06-14 RX ORDER — CHOLECALCIFEROL (VITAMIN D3) 125 MCG
1 CAPSULE ORAL
Qty: 0 | Refills: 0 | DISCHARGE

## 2024-06-14 RX ORDER — TENOFOVIR DISOPROXIL FUMARATE 300 MG/1
1 TABLET, FILM COATED ORAL
Refills: 0 | DISCHARGE

## 2024-06-14 RX ORDER — ICOSAPENT ETHYL 500 MG/1
2 CAPSULE, LIQUID FILLED ORAL
Refills: 0 | DISCHARGE

## 2024-06-14 RX ORDER — PIOGLITAZONE HYDROCHLORIDE 15 MG/1
1 TABLET ORAL
Qty: 0 | Refills: 0 | DISCHARGE

## 2024-06-14 RX ORDER — SITAGLIPTIN 50 MG/1
1 TABLET, FILM COATED ORAL
Refills: 0 | DISCHARGE

## 2024-06-14 RX ORDER — TICAGRELOR 90 MG/1
1 TABLET ORAL
Refills: 0 | DISCHARGE

## 2024-06-14 RX ORDER — FUROSEMIDE 40 MG
1 TABLET ORAL
Refills: 0 | DISCHARGE

## 2024-06-14 RX ORDER — ASPIRIN/CALCIUM CARB/MAGNESIUM 324 MG
325 TABLET ORAL ONCE
Refills: 0 | Status: COMPLETED | OUTPATIENT
Start: 2024-06-14 | End: 2024-06-14

## 2024-06-14 RX ORDER — CARVEDILOL PHOSPHATE 80 MG/1
0.5 CAPSULE, EXTENDED RELEASE ORAL
Refills: 0 | DISCHARGE

## 2024-06-14 RX ORDER — HYDRALAZINE HCL 50 MG
10 TABLET ORAL ONCE
Refills: 0 | Status: ACTIVE | OUTPATIENT
Start: 2024-06-14 | End: 2024-06-14

## 2024-06-14 RX ORDER — SODIUM CHLORIDE 9 MG/ML
1000 INJECTION INTRAMUSCULAR; INTRAVENOUS; SUBCUTANEOUS
Refills: 0 | Status: ACTIVE | OUTPATIENT
Start: 2024-06-14 | End: 2024-06-14

## 2024-06-14 RX ORDER — ICOSAPENT ETHYL 500 MG/1
2 CAPSULE, LIQUID FILLED ORAL
Qty: 0 | Refills: 0 | DISCHARGE

## 2024-06-14 RX ORDER — FEBUXOSTAT 40 MG/1
1 TABLET ORAL
Refills: 0 | DISCHARGE

## 2024-06-14 RX ORDER — ASPIRIN/CALCIUM CARB/MAGNESIUM 324 MG
81 TABLET ORAL ONCE
Refills: 0 | Status: DISCONTINUED | OUTPATIENT
Start: 2024-06-14 | End: 2024-06-14

## 2024-06-14 RX ORDER — SODIUM BICARBONATE 1 MEQ/ML
2 SYRINGE (ML) INTRAVENOUS
Refills: 0 | DISCHARGE

## 2024-06-14 RX ORDER — HYDRALAZINE HCL 50 MG
10 TABLET ORAL ONCE
Refills: 0 | Status: COMPLETED | OUTPATIENT
Start: 2024-06-14 | End: 2024-06-14

## 2024-06-14 RX ORDER — FERROUS FUMARATE 350(115)MG
1 TABLET ORAL
Refills: 0 | DISCHARGE

## 2024-06-14 RX ADMIN — Medication 325 MILLIGRAM(S): at 12:17

## 2024-06-14 RX ADMIN — SODIUM CHLORIDE 50 MILLILITER(S): 9 INJECTION INTRAMUSCULAR; INTRAVENOUS; SUBCUTANEOUS at 11:51

## 2024-06-14 RX ADMIN — TICAGRELOR 180 MILLIGRAM(S): 90 TABLET ORAL at 12:18

## 2024-06-14 RX ADMIN — Medication 10 MILLIGRAM(S): at 15:40

## 2024-06-14 RX ADMIN — SODIUM CHLORIDE 600 MILLILITER(S): 9 INJECTION INTRAMUSCULAR; INTRAVENOUS; SUBCUTANEOUS at 11:51

## 2024-06-14 RX ADMIN — SODIUM CHLORIDE 150 MILLILITER(S): 9 INJECTION INTRAMUSCULAR; INTRAVENOUS; SUBCUTANEOUS at 16:33

## 2024-06-14 RX ADMIN — SODIUM CHLORIDE 250 MILLILITER(S): 9 INJECTION INTRAMUSCULAR; INTRAVENOUS; SUBCUTANEOUS at 12:40

## 2024-06-14 NOTE — PROGRESS NOTE ADULT - SUBJECTIVE AND OBJECTIVE BOX
Interventional Cardiology PA Sheath Pull Note    Pt without complaints. VSS    Pre-Sheath Removal:    [x] Right     [ ] Left          [ ] Groin    [ ] Brachial    [x] 5Fr      [ ] 6Fr    [ ] 7Fr     [ ] 8Fr    [x] Arterial  [ ] Venous sheath in place    Pulses:    [ ] Right     [ ] Left       DP:  [x]  Doppler   [ ]  Faint    [ ]   1+    [ ] 2+       Hemostasis Achieved with: 15 minutes manual pressure    Vasovagal Reaction: No    Meds Given: hydralazine 10 mg IVP x1 (SBP >180)      Post-Sheath Removal: R groin, no hematoma, no bruit, c/d/i, DP/PT doppler       A/P:  s/p [x] Dx Cath    -Continue bedrest x 2 hours (pt given instructions)  -Can ambulate at 6 PM if access site stable   -Continue to monitor
Interventional Cardiology PA SDA Discharge Note    Patient without complaints. Ambulated and voided without difficulties  Afebrile, VSS  Ext:   Right Groin:  no  hematoma,  no bruit, dressing; C/D/I  Pulses:    intact DP/PT to baseline     A/P:    s/p diagnostic LHC (6/14/24) revealing patent grafts (full report pending.     ACCESS: RFA w/ 5French sheath s/p manual pull.   IVF: NS 150cc/hr x2hrs    1.	Stable for discharge as per attending Dr. Chopra after bed rest, pt voids, groin/wrist stable and 30 minutes of ambulation.  2.	Follow-up with PMD/Cardiologist Dr. Mario in 1-2 weeks  3.	Discharged forms signed and copies in chart

## 2024-06-21 DIAGNOSIS — I25.708 ATHEROSCLEROSIS OF CORONARY ARTERY BYPASS GRAFT(S), UNSPECIFIED, WITH OTHER FORMS OF ANGINA PECTORIS: ICD-10-CM

## 2024-06-21 DIAGNOSIS — I20.0 UNSTABLE ANGINA: ICD-10-CM
